# Patient Record
Sex: MALE | Race: WHITE | Employment: FULL TIME | URBAN - METROPOLITAN AREA
[De-identification: names, ages, dates, MRNs, and addresses within clinical notes are randomized per-mention and may not be internally consistent; named-entity substitution may affect disease eponyms.]

---

## 2021-02-24 ENCOUNTER — HOSPITAL ENCOUNTER (OUTPATIENT)
Dept: LAB | Age: 56
Discharge: HOME | End: 2021-02-24

## 2022-08-24 ENCOUNTER — APPOINTMENT (OUTPATIENT)
Dept: GENERAL RADIOLOGY | Age: 57
DRG: 330 | End: 2022-08-24
Attending: SURGERY
Payer: COMMERCIAL

## 2022-08-24 ENCOUNTER — HOSPITAL ENCOUNTER (INPATIENT)
Age: 57
LOS: 12 days | Discharge: HOME | DRG: 330 | End: 2022-09-05
Attending: STUDENT IN AN ORGANIZED HEALTH CARE EDUCATION/TRAINING PROGRAM | Admitting: SURGERY
Payer: COMMERCIAL

## 2022-08-24 ENCOUNTER — APPOINTMENT (OUTPATIENT)
Dept: CT IMAGING | Age: 57
DRG: 330 | End: 2022-08-24
Attending: STUDENT IN AN ORGANIZED HEALTH CARE EDUCATION/TRAINING PROGRAM
Payer: COMMERCIAL

## 2022-08-24 DIAGNOSIS — K57.20 DIVERTICULITIS OF COLON WITH PERFORATION: ICD-10-CM

## 2022-08-24 DIAGNOSIS — K57.80 PERFORATED DIVERTICULUM: Primary | ICD-10-CM

## 2022-08-24 LAB
ALBUMIN SERPL-MCNC: 3.1 G/DL (ref 3.5–5)
ALBUMIN/GLOB SERPL: 0.6 {RATIO} (ref 1.1–2.2)
ALP SERPL-CCNC: 76 U/L (ref 45–117)
ALT SERPL-CCNC: 27 U/L (ref 12–78)
ANION GAP SERPL CALC-SCNC: 6 MMOL/L (ref 5–15)
APPEARANCE UR: CLEAR
AST SERPL-CCNC: 13 U/L (ref 15–37)
ATRIAL RATE: 99 BPM
BACTERIA URNS QL MICRO: NEGATIVE /HPF
BASOPHILS # BLD: 0 K/UL (ref 0–0.1)
BASOPHILS NFR BLD: 0 % (ref 0–1)
BILIRUB SERPL-MCNC: 0.9 MG/DL (ref 0.2–1)
BILIRUB UR QL: NEGATIVE
BUN SERPL-MCNC: 18 MG/DL (ref 6–20)
BUN/CREAT SERPL: 13 (ref 12–20)
CALCIUM SERPL-MCNC: 9.4 MG/DL (ref 8.5–10.1)
CALCULATED P AXIS, ECG09: 50 DEGREES
CALCULATED R AXIS, ECG10: 74 DEGREES
CALCULATED T AXIS, ECG11: 57 DEGREES
CHLORIDE SERPL-SCNC: 95 MMOL/L (ref 97–108)
CO2 SERPL-SCNC: 28 MMOL/L (ref 21–32)
COLOR UR: ABNORMAL
CREAT SERPL-MCNC: 1.35 MG/DL (ref 0.7–1.3)
DIAGNOSIS, 93000: NORMAL
DIFFERENTIAL METHOD BLD: ABNORMAL
EOSINOPHIL # BLD: 0 K/UL (ref 0–0.4)
EOSINOPHIL NFR BLD: 0 % (ref 0–7)
EPITH CASTS URNS QL MICRO: ABNORMAL /LPF
ERYTHROCYTE [DISTWIDTH] IN BLOOD BY AUTOMATED COUNT: 13.2 % (ref 11.5–14.5)
GLOBULIN SER CALC-MCNC: 5 G/DL (ref 2–4)
GLUCOSE SERPL-MCNC: 146 MG/DL (ref 65–100)
GLUCOSE UR STRIP.AUTO-MCNC: NEGATIVE MG/DL
HCT VFR BLD AUTO: 45.5 % (ref 36.6–50.3)
HGB BLD-MCNC: 16.1 G/DL (ref 12.1–17)
HGB UR QL STRIP: NEGATIVE
HYALINE CASTS URNS QL MICRO: ABNORMAL /LPF (ref 0–2)
IMM GRANULOCYTES # BLD AUTO: 0.3 K/UL (ref 0–0.04)
IMM GRANULOCYTES NFR BLD AUTO: 1 % (ref 0–0.5)
KETONES UR QL STRIP.AUTO: NEGATIVE MG/DL
LEUKOCYTE ESTERASE UR QL STRIP.AUTO: NEGATIVE
LIPASE SERPL-CCNC: 60 U/L (ref 73–393)
LYMPHOCYTES # BLD: 0.7 K/UL (ref 0.8–3.5)
LYMPHOCYTES NFR BLD: 3 % (ref 12–49)
MCH RBC QN AUTO: 36.9 PG (ref 26–34)
MCHC RBC AUTO-ENTMCNC: 35.4 G/DL (ref 30–36.5)
MCV RBC AUTO: 104.4 FL (ref 80–99)
MONOCYTES # BLD: 1 K/UL (ref 0–1)
MONOCYTES NFR BLD: 4 % (ref 5–13)
NEUTS SEG # BLD: 22.9 K/UL (ref 1.8–8)
NEUTS SEG NFR BLD: 92 % (ref 32–75)
NITRITE UR QL STRIP.AUTO: NEGATIVE
NRBC # BLD: 0 K/UL (ref 0–0.01)
NRBC BLD-RTO: 0 PER 100 WBC
P-R INTERVAL, ECG05: 174 MS
PH UR STRIP: 6.5 [PH] (ref 5–8)
PLATELET # BLD AUTO: 314 K/UL (ref 150–400)
PMV BLD AUTO: 9 FL (ref 8.9–12.9)
POTASSIUM SERPL-SCNC: 3.7 MMOL/L (ref 3.5–5.1)
PROT SERPL-MCNC: 8.1 G/DL (ref 6.4–8.2)
PROT UR STRIP-MCNC: ABNORMAL MG/DL
Q-T INTERVAL, ECG07: 358 MS
QRS DURATION, ECG06: 96 MS
QTC CALCULATION (BEZET), ECG08: 459 MS
RBC # BLD AUTO: 4.36 M/UL (ref 4.1–5.7)
RBC #/AREA URNS HPF: ABNORMAL /HPF (ref 0–5)
RBC MORPH BLD: ABNORMAL
SODIUM SERPL-SCNC: 129 MMOL/L (ref 136–145)
SP GR UR REFRACTOMETRY: <1.005 (ref 1–1.03)
UA: UC IF INDICATED,UAUC: ABNORMAL
UROBILINOGEN UR QL STRIP.AUTO: 1 EU/DL (ref 0.2–1)
VENTRICULAR RATE, ECG03: 99 BPM
WBC # BLD AUTO: 24.9 K/UL (ref 4.1–11.1)
WBC MORPH BLD: ABNORMAL
WBC URNS QL MICRO: ABNORMAL /HPF (ref 0–4)

## 2022-08-24 PROCEDURE — 74011250637 HC RX REV CODE- 250/637: Performed by: SURGERY

## 2022-08-24 PROCEDURE — 74177 CT ABD & PELVIS W/CONTRAST: CPT

## 2022-08-24 PROCEDURE — 71045 X-RAY EXAM CHEST 1 VIEW: CPT

## 2022-08-24 PROCEDURE — 83690 ASSAY OF LIPASE: CPT

## 2022-08-24 PROCEDURE — 99285 EMERGENCY DEPT VISIT HI MDM: CPT

## 2022-08-24 PROCEDURE — 36415 COLL VENOUS BLD VENIPUNCTURE: CPT

## 2022-08-24 PROCEDURE — 81001 URINALYSIS AUTO W/SCOPE: CPT

## 2022-08-24 PROCEDURE — 65270000029 HC RM PRIVATE

## 2022-08-24 PROCEDURE — 96365 THER/PROPH/DIAG IV INF INIT: CPT

## 2022-08-24 PROCEDURE — 80053 COMPREHEN METABOLIC PANEL: CPT

## 2022-08-24 PROCEDURE — 85025 COMPLETE CBC W/AUTO DIFF WBC: CPT

## 2022-08-24 PROCEDURE — 74011000636 HC RX REV CODE- 636: Performed by: STUDENT IN AN ORGANIZED HEALTH CARE EDUCATION/TRAINING PROGRAM

## 2022-08-24 PROCEDURE — 74011000250 HC RX REV CODE- 250: Performed by: SURGERY

## 2022-08-24 PROCEDURE — 74011250636 HC RX REV CODE- 250/636: Performed by: SURGERY

## 2022-08-24 PROCEDURE — 99222 1ST HOSP IP/OBS MODERATE 55: CPT | Performed by: SURGERY

## 2022-08-24 PROCEDURE — 74018 RADEX ABDOMEN 1 VIEW: CPT

## 2022-08-24 PROCEDURE — 93005 ELECTROCARDIOGRAM TRACING: CPT

## 2022-08-24 PROCEDURE — 74011250636 HC RX REV CODE- 250/636: Performed by: STUDENT IN AN ORGANIZED HEALTH CARE EDUCATION/TRAINING PROGRAM

## 2022-08-24 PROCEDURE — 96366 THER/PROPH/DIAG IV INF ADDON: CPT

## 2022-08-24 PROCEDURE — 96375 TX/PRO/DX INJ NEW DRUG ADDON: CPT

## 2022-08-24 RX ORDER — LEVOFLOXACIN 5 MG/ML
750 INJECTION, SOLUTION INTRAVENOUS EVERY 24 HOURS
Status: DISCONTINUED | OUTPATIENT
Start: 2022-08-25 | End: 2022-09-05 | Stop reason: HOSPADM

## 2022-08-24 RX ORDER — DEXTROSE, SODIUM CHLORIDE, AND POTASSIUM CHLORIDE 5; .45; .15 G/100ML; G/100ML; G/100ML
75 INJECTION INTRAVENOUS CONTINUOUS
Status: DISCONTINUED | OUTPATIENT
Start: 2022-08-24 | End: 2022-09-05 | Stop reason: HOSPADM

## 2022-08-24 RX ORDER — HYDROMORPHONE HYDROCHLORIDE 1 MG/ML
0.5 INJECTION, SOLUTION INTRAMUSCULAR; INTRAVENOUS; SUBCUTANEOUS
Status: DISCONTINUED | OUTPATIENT
Start: 2022-08-24 | End: 2022-09-05 | Stop reason: HOSPADM

## 2022-08-24 RX ORDER — ONDANSETRON 2 MG/ML
4 INJECTION INTRAMUSCULAR; INTRAVENOUS
Status: DISCONTINUED | OUTPATIENT
Start: 2022-08-24 | End: 2022-09-05 | Stop reason: HOSPADM

## 2022-08-24 RX ORDER — MIDAZOLAM HYDROCHLORIDE 1 MG/ML
2 INJECTION, SOLUTION INTRAMUSCULAR; INTRAVENOUS ONCE
Status: DISPENSED | OUTPATIENT
Start: 2022-08-24 | End: 2022-08-25

## 2022-08-24 RX ORDER — HYDROMORPHONE HYDROCHLORIDE 1 MG/ML
1 INJECTION, SOLUTION INTRAMUSCULAR; INTRAVENOUS; SUBCUTANEOUS ONCE
Status: COMPLETED | OUTPATIENT
Start: 2022-08-24 | End: 2022-08-24

## 2022-08-24 RX ORDER — LEVOFLOXACIN 5 MG/ML
750 INJECTION, SOLUTION INTRAVENOUS
Status: COMPLETED | OUTPATIENT
Start: 2022-08-24 | End: 2022-08-24

## 2022-08-24 RX ORDER — METRONIDAZOLE 500 MG/100ML
500 INJECTION, SOLUTION INTRAVENOUS
Status: COMPLETED | OUTPATIENT
Start: 2022-08-24 | End: 2022-08-24

## 2022-08-24 RX ORDER — METRONIDAZOLE 500 MG/100ML
500 INJECTION, SOLUTION INTRAVENOUS EVERY 8 HOURS
Status: DISCONTINUED | OUTPATIENT
Start: 2022-08-24 | End: 2022-08-31

## 2022-08-24 RX ORDER — MORPHINE SULFATE 2 MG/ML
4 INJECTION, SOLUTION INTRAMUSCULAR; INTRAVENOUS ONCE
Status: COMPLETED | OUTPATIENT
Start: 2022-08-24 | End: 2022-08-24

## 2022-08-24 RX ADMIN — SODIUM CHLORIDE 1000 ML: 9 INJECTION, SOLUTION INTRAVENOUS at 10:44

## 2022-08-24 RX ADMIN — METRONIDAZOLE 500 MG: 500 INJECTION, SOLUTION INTRAVENOUS at 12:30

## 2022-08-24 RX ADMIN — HYDROMORPHONE HYDROCHLORIDE 0.5 MG: 1 INJECTION, SOLUTION INTRAMUSCULAR; INTRAVENOUS; SUBCUTANEOUS at 20:36

## 2022-08-24 RX ADMIN — BENZOCAINE 1 SPRAY: 200 SPRAY DENTAL; ORAL; PERIODONTAL at 14:00

## 2022-08-24 RX ADMIN — MORPHINE SULFATE 4 MG: 2 INJECTION, SOLUTION INTRAMUSCULAR; INTRAVENOUS at 11:11

## 2022-08-24 RX ADMIN — METRONIDAZOLE 500 MG: 500 INJECTION, SOLUTION INTRAVENOUS at 23:00

## 2022-08-24 RX ADMIN — Medication 1 SPRAY: at 17:17

## 2022-08-24 RX ADMIN — IOPAMIDOL 100 ML: 755 INJECTION, SOLUTION INTRAVENOUS at 11:23

## 2022-08-24 RX ADMIN — POTASSIUM CHLORIDE, DEXTROSE MONOHYDRATE AND SODIUM CHLORIDE 125 ML/HR: 150; 5; 450 INJECTION, SOLUTION INTRAVENOUS at 15:38

## 2022-08-24 RX ADMIN — HYDROMORPHONE HYDROCHLORIDE 1 MG: 1 INJECTION, SOLUTION INTRAMUSCULAR; INTRAVENOUS; SUBCUTANEOUS at 14:00

## 2022-08-24 RX ADMIN — LEVOFLOXACIN 750 MG: 5 INJECTION, SOLUTION INTRAVENOUS at 12:33

## 2022-08-24 NOTE — ED PROVIDER NOTES
EMERGENCY DEPARTMENT HISTORY AND PHYSICAL EXAM      Date: 8/24/2022  Patient Name: Rebecca Martinez    History of Presenting Illness     Chief Complaint   Patient presents with    Abdominal Pain     Sharp x 2 days, had telehealth visit with GI doctor, no improvement with symptoms at home, history of diverticulitis, difficulty urinating         HPI: Rebecca Martinez, 61 y.o. male presents to the ED with cc of abdominal pain. This has been going on for the past 3 days. He describes it as a diffuse lower abdominal pain that is achy and become sharp and worse with movement. Better with laying still. Saw his GI doctor and started taking antibiotics yesterday due to a history of diverticulitis and suspicion for this. After taking the antibiotics, he developed increasing bloating and belching, and 3 episodes of nonbloody emesis. He had a bowel movement yesterday. He reports some mild dysuria also that started yesterday. Reports a history of diverticulitis in the past, has not required surgery for diverticulitis before, usually resolves with oral antibiotics outpatient. He reports sweats without measured fevers. There are no other complaints, changes, or physical findings at this time. PCP: None      Medications: Aspirin, folbee, irbesartan    Past History     Past Medical History:  Hypertension, prostate cancer in remission, CRA O    Past Surgical History:  Prostatectomy, hernia surgery x3    Family History:  No family history on file. Social History:  Reports tobacco history, no alcohol or illicit drug use    Allergies:  No Known Allergies      Review of Systems   no fever  No ear pain  No eye pain  no shortness of breath  no chest pain  Reports lower abdominal pain  Reports dysuria  no leg pain  No rash  No lymphadenopathy  No weight gain    Physical Exam   Physical Exam  Constitutional:       General: He is not in acute distress. Appearance: He is not toxic-appearing.    HENT:      Head: Normocephalic and atraumatic. Eyes:      Extraocular Movements: Extraocular movements intact. Cardiovascular:      Rate and Rhythm: Normal rate and regular rhythm. Pulmonary:      Effort: Pulmonary effort is normal.      Breath sounds: Normal breath sounds. Abdominal:      Palpations: Abdomen is soft. Tenderness: There is abdominal tenderness. Comments: Abdomen is mildly distended, there is diffuse tenderness to palpation in the lower abdomen without guarding or rebound tenderness   Musculoskeletal:         General: No deformity. Cervical back: Neck supple. Skin:     General: Skin is warm and dry. Neurological:      General: No focal deficit present. Mental Status: He is alert and oriented to person, place, and time.    Psychiatric:         Mood and Affect: Mood normal.       Diagnostic Study Results     Labs -     Recent Results (from the past 24 hour(s))   EKG, 12 LEAD, INITIAL    Collection Time: 08/24/22 10:21 AM   Result Value Ref Range    Ventricular Rate 99 BPM    Atrial Rate 99 BPM    P-R Interval 174 ms    QRS Duration 96 ms    Q-T Interval 358 ms    QTC Calculation (Bezet) 459 ms    Calculated P Axis 50 degrees    Calculated R Axis 74 degrees    Calculated T Axis 57 degrees    Diagnosis       Normal sinus rhythm  Possible Left atrial enlargement  No previous ECGs available     CBC WITH AUTOMATED DIFF    Collection Time: 08/24/22 10:25 AM   Result Value Ref Range    WBC 24.9 (H) 4.1 - 11.1 K/uL    RBC 4.36 4.10 - 5.70 M/uL    HGB 16.1 12.1 - 17.0 g/dL    HCT 45.5 36.6 - 50.3 %    .4 (H) 80.0 - 99.0 FL    MCH 36.9 (H) 26.0 - 34.0 PG    MCHC 35.4 30.0 - 36.5 g/dL    RDW 13.2 11.5 - 14.5 %    PLATELET 057 853 - 852 K/uL    MPV 9.0 8.9 - 12.9 FL    NRBC 0.0 0  WBC    ABSOLUTE NRBC 0.00 0.00 - 0.01 K/uL    NEUTROPHILS 92 (H) 32 - 75 %    LYMPHOCYTES 3 (L) 12 - 49 %    MONOCYTES 4 (L) 5 - 13 %    EOSINOPHILS 0 0 - 7 %    BASOPHILS 0 0 - 1 %    IMMATURE GRANULOCYTES 1 (H) 0.0 - 0.5 %    ABS. NEUTROPHILS 22.9 (H) 1.8 - 8.0 K/UL    ABS. LYMPHOCYTES 0.7 (L) 0.8 - 3.5 K/UL    ABS. MONOCYTES 1.0 0.0 - 1.0 K/UL    ABS. EOSINOPHILS 0.0 0.0 - 0.4 K/UL    ABS. BASOPHILS 0.0 0.0 - 0.1 K/UL    ABS. IMM. GRANS. 0.3 (H) 0.00 - 0.04 K/UL    DF SMEAR SCANNED      RBC COMMENTS MACROCYTOSIS  1+        WBC COMMENTS FEW     METABOLIC PANEL, COMPREHENSIVE    Collection Time: 08/24/22 10:25 AM   Result Value Ref Range    Sodium 129 (L) 136 - 145 mmol/L    Potassium 3.7 3.5 - 5.1 mmol/L    Chloride 95 (L) 97 - 108 mmol/L    CO2 28 21 - 32 mmol/L    Anion gap 6 5 - 15 mmol/L    Glucose 146 (H) 65 - 100 mg/dL    BUN 18 6 - 20 MG/DL    Creatinine 1.35 (H) 0.70 - 1.30 MG/DL    BUN/Creatinine ratio 13 12 - 20      GFR est AA >60 >60 ml/min/1.73m2    GFR est non-AA 54 (L) >60 ml/min/1.73m2    Calcium 9.4 8.5 - 10.1 MG/DL    Bilirubin, total 0.9 0.2 - 1.0 MG/DL    ALT (SGPT) 27 12 - 78 U/L    AST (SGOT) 13 (L) 15 - 37 U/L    Alk. phosphatase 76 45 - 117 U/L    Protein, total 8.1 6.4 - 8.2 g/dL    Albumin 3.1 (L) 3.5 - 5.0 g/dL    Globulin 5.0 (H) 2.0 - 4.0 g/dL    A-G Ratio 0.6 (L) 1.1 - 2.2     LIPASE    Collection Time: 08/24/22 10:25 AM   Result Value Ref Range    Lipase 60 (L) 73 - 393 U/L       Radiologic Studies -   CT ABD PELV W CONT   Final Result      1. Diverticulitis of the sigmoid colon with contained perforation. Small amount   of extraluminal gas extends in the right lower quadrant. Stranding in the   adjacent fat but no drainable fluid collection. Given degree of wall thickening   recommend nonemergent colonoscopy to exclude underlying mass lesion   2. Distal small bowel obstruction likely due to the adjacent sigmoid   diverticulitis        CT Results  (Last 48 hours)                 08/24/22 1124  CT ABD PELV W CONT Final result    Impression:      1. Diverticulitis of the sigmoid colon with contained perforation. Small amount   of extraluminal gas extends in the right lower quadrant. Stranding in the   adjacent fat but no drainable fluid collection. Given degree of wall thickening   recommend nonemergent colonoscopy to exclude underlying mass lesion   2. Distal small bowel obstruction likely due to the adjacent sigmoid   diverticulitis       Narrative:  EXAM: CT ABD PELV W CONT       INDICATION: lower abd pain       COMPARISON: None        CONTRAST: 100 mL of Isovue-370. ORAL CONTRAST: None       TECHNIQUE:    Following the uneventful intravenous administration of contrast, thin axial   images were obtained through the abdomen and pelvis. Coronal and sagittal   reconstructions were generated. CT dose reduction was achieved through use of a   standardized protocol tailored for this examination and automatic exposure   control for dose modulation. FINDINGS:    LOWER THORAX: Atelectasis right base greater than left   LIVER: Enlarged fatty liver   BILIARY TREE: Gallbladder is within normal limits. CBD is not dilated. SPLEEN: within normal limits. PANCREAS: No mass or ductal dilatation. ADRENALS: Unremarkable. KIDNEYS: No mass, calculus, or hydronephrosis. STOMACH: Unremarkable. SMALL BOWEL: Distal small bowel obstruction   COLON: Sigmoid colon is enlarged and inflamed. Multiple diverticula. Small   amount of extraluminal gas extending from the mid sigmoid colon into the right   lower quadrant indicating small contained localized perforation. APPENDIX: Not identified   PERITONEUM: See above. Trace free fluid within the pelvis. There is no drainable   abscess   RETROPERITONEUM: There are vascular calcifications without aneurysm. No   pathologic adenopathy   REPRODUCTIVE ORGANS: Not enlarged   URINARY BLADDER: No mass or calculus. BONES: No destructive bone lesion. ABDOMINAL WALL: No mass or hernia. ADDITIONAL COMMENTS: N/A                 CXR Results  (Last 48 hours)      None              Medical Decision Making   I am the first provider for this patient.     I reviewed the vital signs, available nursing notes, past medical history, past surgical history, family history and social history. Vital Signs-Reviewed the patient's vital signs. Patient Vitals for the past 24 hrs:   Temp Pulse Resp BP SpO2   08/24/22 1133 97 °F (36.1 °C) 92 16 114/78 98 %   08/24/22 1012 97.7 °F (36.5 °C) (!) 116 18 96/70 97 %         Provider Notes (Medical Decision Making):   65-year-old male presenting with lower abdominal pain. Differential includes diverticulitis, perforation, abscess, cystitis, ileus, obstruction. He is afebrile and nontoxic-appearing. Tachycardic in triage, however on my evaluation vitals are unremarkable. He is given IV fluids and pain medications, CT will be obtained. ED Course:     Initial assessment performed. The patients presenting problems have been discussed, and they are in agreement with the care plan formulated and outlined with them. I have encouraged them to ask questions as they arise throughout their visit. ED Course as of 08/24/22 1233   Wed Aug 24, 2022   1205 Paged lorin wood [CM]      ED Course User Index  [CM] Kristal Ward MD      CBC shows leukocytosis of 24.9, negative for anemia, basic metabolic panel creatinine elevated 1.35, hyponatremia with sodium of 129. CT scan shows diverticulitis with contained perforation. Patient is resting comfortably on reevaluation, pain controlled. Spoke with Erica Reece, who agrees with admission and antibiotics. Critical Care Time:         Disposition:  Admit    PLAN:  1. There are no discharge medications for this patient.     2.   Follow-up Information    None       Return to ED if worse     Diagnosis     Clinical Impression: Acute perforated diverticulitis

## 2022-08-24 NOTE — H&P
Surgery History and Physcial    Subjective:      Gab Lu is a 61 y.o. male who presents for evaluation of abdominal pain. The pain is located in the LLQ without radiation. Pain is described as sharp and measures 7/10 in intensity. Onset of pain was 36 hours ago. Aggravating factors include movement and eating. Alleviating factors include none. Associated symptoms include anorexia, constipation, nausea, and vomiting. Pt has had 2 episodes of outpatient managed diverticulitis which were mild, in 2008 and 2014. Last colonoscopy was 2 years ago with Dr. Surjit Islas. PMH is + only for h/o diverticulitis. No PSH. Patient Active Problem List    Diagnosis Date Noted    Diverticulitis of colon with perforation 08/24/2022     No past medical history on file. No past surgical history on file. Social History     Tobacco Use    Smoking status: Not on file    Smokeless tobacco: Not on file   Substance Use Topics    Alcohol use: Not on file      No family history on file. Prior to Admission medications    Not on File     No Known Allergies      Review of Systems   Constitutional:  Positive for appetite change. Negative for chills, diaphoresis and fever. Respiratory:  Negative for shortness of breath and wheezing. Cardiovascular:  Negative for chest pain and palpitations. Gastrointestinal:  Positive for abdominal distention, abdominal pain, constipation, nausea and vomiting. Negative for diarrhea. Musculoskeletal:  Negative for myalgias. Hematological:  Does not bruise/bleed easily. All other systems reviewed and are negative. Objective:     Visit Vitals  /78   Pulse 92   Temp 97 °F (36.1 °C)   Resp 16   Ht 5' 10\" (1.778 m)   Wt 168 lb 3.4 oz (76.3 kg)   SpO2 98%   BMI 24.14 kg/m²       Physical Exam  Constitutional:       General: He is not in acute distress. Appearance: He is well-developed and normal weight. HENT:      Head: Normocephalic and atraumatic.    Eyes:      General: No scleral icterus. Pupils: Pupils are equal, round, and reactive to light. Cardiovascular:      Rate and Rhythm: Normal rate and regular rhythm. Heart sounds: Normal heart sounds. Pulmonary:      Breath sounds: Normal breath sounds. No wheezing or rales. Abdominal:      General: Abdomen is flat. Bowel sounds are normal. There is distension. Palpations: Abdomen is soft. There is no mass. Tenderness: There is abdominal tenderness in the suprapubic area and left lower quadrant. There is guarding. There is no rebound. Musculoskeletal:         General: Normal range of motion. Lymphadenopathy:      Cervical: No cervical adenopathy. Neurological:      General: No focal deficit present. Mental Status: He is alert and oriented to person, place, and time.        Imaging:  images and reports reviewed    Lab Review:    Recent Results (from the past 24 hour(s))   EKG, 12 LEAD, INITIAL    Collection Time: 08/24/22 10:21 AM   Result Value Ref Range    Ventricular Rate 99 BPM    Atrial Rate 99 BPM    P-R Interval 174 ms    QRS Duration 96 ms    Q-T Interval 358 ms    QTC Calculation (Bezet) 459 ms    Calculated P Axis 50 degrees    Calculated R Axis 74 degrees    Calculated T Axis 57 degrees    Diagnosis       Normal sinus rhythm  Possible Left atrial enlargement  No previous ECGs available  Confirmed by [place] [employer] ([id number]) on 8/24/2022 12:43:41 PM     CBC WITH AUTOMATED DIFF    Collection Time: 08/24/22 10:25 AM   Result Value Ref Range    WBC 24.9 (H) 4.1 - 11.1 K/uL    RBC 4.36 4.10 - 5.70 M/uL    HGB 16.1 12.1 - 17.0 g/dL    HCT 45.5 36.6 - 50.3 %    .4 (H) 80.0 - 99.0 FL    MCH 36.9 (H) 26.0 - 34.0 PG    MCHC 35.4 30.0 - 36.5 g/dL    RDW 13.2 11.5 - 14.5 %    PLATELET 197 721 - 699 K/uL    MPV 9.0 8.9 - 12.9 FL    NRBC 0.0 0  WBC    ABSOLUTE NRBC 0.00 0.00 - 0.01 K/uL    NEUTROPHILS 92 (H) 32 - 75 %    LYMPHOCYTES 3 (L) 12 - 49 %    MONOCYTES 4 (L) 5 - 13 %    EOSINOPHILS 0 0 - 7 %    BASOPHILS 0 0 - 1 %    IMMATURE GRANULOCYTES 1 (H) 0.0 - 0.5 %    ABS. NEUTROPHILS 22.9 (H) 1.8 - 8.0 K/UL    ABS. LYMPHOCYTES 0.7 (L) 0.8 - 3.5 K/UL    ABS. MONOCYTES 1.0 0.0 - 1.0 K/UL    ABS. EOSINOPHILS 0.0 0.0 - 0.4 K/UL    ABS. BASOPHILS 0.0 0.0 - 0.1 K/UL    ABS. IMM. GRANS. 0.3 (H) 0.00 - 0.04 K/UL    DF SMEAR SCANNED      RBC COMMENTS MACROCYTOSIS  1+        WBC COMMENTS FEW     METABOLIC PANEL, COMPREHENSIVE    Collection Time: 08/24/22 10:25 AM   Result Value Ref Range    Sodium 129 (L) 136 - 145 mmol/L    Potassium 3.7 3.5 - 5.1 mmol/L    Chloride 95 (L) 97 - 108 mmol/L    CO2 28 21 - 32 mmol/L    Anion gap 6 5 - 15 mmol/L    Glucose 146 (H) 65 - 100 mg/dL    BUN 18 6 - 20 MG/DL    Creatinine 1.35 (H) 0.70 - 1.30 MG/DL    BUN/Creatinine ratio 13 12 - 20      GFR est AA >60 >60 ml/min/1.73m2    GFR est non-AA 54 (L) >60 ml/min/1.73m2    Calcium 9.4 8.5 - 10.1 MG/DL    Bilirubin, total 0.9 0.2 - 1.0 MG/DL    ALT (SGPT) 27 12 - 78 U/L    AST (SGOT) 13 (L) 15 - 37 U/L    Alk.  phosphatase 76 45 - 117 U/L    Protein, total 8.1 6.4 - 8.2 g/dL    Albumin 3.1 (L) 3.5 - 5.0 g/dL    Globulin 5.0 (H) 2.0 - 4.0 g/dL    A-G Ratio 0.6 (L) 1.1 - 2.2     LIPASE    Collection Time: 08/24/22 10:25 AM   Result Value Ref Range    Lipase 60 (L) 73 - 393 U/L   URINALYSIS W/ REFLEX CULTURE    Collection Time: 08/24/22 12:24 PM    Specimen: Urine   Result Value Ref Range    Color YELLOW/STRAW      Appearance CLEAR CLEAR      Specific gravity <1.005 1.003 - 1.030    pH (UA) 6.5 5.0 - 8.0      Protein TRACE (A) NEG mg/dL    Glucose Negative NEG mg/dL    Ketone Negative NEG mg/dL    Bilirubin Negative NEG      Blood Negative NEG      Urobilinogen 1.0 0.2 - 1.0 EU/dL    Nitrites Negative NEG      Leukocyte Esterase Negative NEG      UA:UC IF INDICATED CULTURE NOT INDICATED BY UA RESULT CNI      WBC 0-4 0 - 4 /hpf    RBC 0-5 0 - 5 /hpf    Epithelial cells FEW FEW /lpf    Bacteria Negative NEG /hpf    Hyaline cast 0-2 0 - 2 /lpf         Assessment:     Abdominal pain, suspect recurrent sigmoid diverticulitis now with microperforation without abscess. Pt also has a high grade distal SBO secondary to the inflammatory process. Plan:     I recommend proceeding with Conservative therapy:  Observation, Intravenous antibiotics, Bowel rest, and NGT decompression. Interval CT in several days. Warrants elective colectomy once acute process is resolved. May require surgical intervention this hospitalization if SBO does not resolve .

## 2022-08-25 ENCOUNTER — APPOINTMENT (OUTPATIENT)
Dept: GENERAL RADIOLOGY | Age: 57
DRG: 330 | End: 2022-08-25
Attending: SURGERY
Payer: COMMERCIAL

## 2022-08-25 LAB
ANION GAP SERPL CALC-SCNC: 6 MMOL/L (ref 5–15)
BASOPHILS # BLD: 0 K/UL (ref 0–0.1)
BASOPHILS NFR BLD: 0 % (ref 0–1)
BUN SERPL-MCNC: 17 MG/DL (ref 6–20)
BUN/CREAT SERPL: 18 (ref 12–20)
CALCIUM SERPL-MCNC: 8.8 MG/DL (ref 8.5–10.1)
CHLORIDE SERPL-SCNC: 100 MMOL/L (ref 97–108)
CO2 SERPL-SCNC: 25 MMOL/L (ref 21–32)
CREAT SERPL-MCNC: 0.96 MG/DL (ref 0.7–1.3)
DIFFERENTIAL METHOD BLD: ABNORMAL
EOSINOPHIL # BLD: 0 K/UL (ref 0–0.4)
EOSINOPHIL NFR BLD: 0 % (ref 0–7)
ERYTHROCYTE [DISTWIDTH] IN BLOOD BY AUTOMATED COUNT: 13.3 % (ref 11.5–14.5)
GLUCOSE SERPL-MCNC: 189 MG/DL (ref 65–100)
HCT VFR BLD AUTO: 41.8 % (ref 36.6–50.3)
HGB BLD-MCNC: 14.6 G/DL (ref 12.1–17)
IMM GRANULOCYTES # BLD AUTO: 0.1 K/UL (ref 0–0.04)
IMM GRANULOCYTES NFR BLD AUTO: 1 % (ref 0–0.5)
LYMPHOCYTES # BLD: 0.9 K/UL (ref 0.8–3.5)
LYMPHOCYTES NFR BLD: 4 % (ref 12–49)
MCH RBC QN AUTO: 36.8 PG (ref 26–34)
MCHC RBC AUTO-ENTMCNC: 34.9 G/DL (ref 30–36.5)
MCV RBC AUTO: 105.3 FL (ref 80–99)
MONOCYTES # BLD: 1.3 K/UL (ref 0–1)
MONOCYTES NFR BLD: 6 % (ref 5–13)
NEUTS SEG # BLD: 18.9 K/UL (ref 1.8–8)
NEUTS SEG NFR BLD: 89 % (ref 32–75)
NRBC # BLD: 0 K/UL (ref 0–0.01)
NRBC BLD-RTO: 0 PER 100 WBC
PLATELET # BLD AUTO: 304 K/UL (ref 150–400)
PMV BLD AUTO: 9.4 FL (ref 8.9–12.9)
POTASSIUM SERPL-SCNC: 4.2 MMOL/L (ref 3.5–5.1)
RBC # BLD AUTO: 3.97 M/UL (ref 4.1–5.7)
SODIUM SERPL-SCNC: 131 MMOL/L (ref 136–145)
WBC # BLD AUTO: 21.3 K/UL (ref 4.1–11.1)

## 2022-08-25 PROCEDURE — 99232 SBSQ HOSP IP/OBS MODERATE 35: CPT | Performed by: SURGERY

## 2022-08-25 PROCEDURE — 74019 RADEX ABDOMEN 2 VIEWS: CPT

## 2022-08-25 PROCEDURE — 36415 COLL VENOUS BLD VENIPUNCTURE: CPT

## 2022-08-25 PROCEDURE — 74011250636 HC RX REV CODE- 250/636: Performed by: SURGERY

## 2022-08-25 PROCEDURE — 80048 BASIC METABOLIC PNL TOTAL CA: CPT

## 2022-08-25 PROCEDURE — 65270000029 HC RM PRIVATE

## 2022-08-25 PROCEDURE — 85025 COMPLETE CBC W/AUTO DIFF WBC: CPT

## 2022-08-25 RX ADMIN — LEVOFLOXACIN 750 MG: 5 INJECTION, SOLUTION INTRAVENOUS at 12:01

## 2022-08-25 RX ADMIN — POTASSIUM CHLORIDE, DEXTROSE MONOHYDRATE AND SODIUM CHLORIDE 125 ML/HR: 150; 5; 450 INJECTION, SOLUTION INTRAVENOUS at 04:49

## 2022-08-25 RX ADMIN — METRONIDAZOLE 500 MG: 500 INJECTION, SOLUTION INTRAVENOUS at 15:15

## 2022-08-25 RX ADMIN — METRONIDAZOLE 500 MG: 500 INJECTION, SOLUTION INTRAVENOUS at 05:22

## 2022-08-25 RX ADMIN — HYDROMORPHONE HYDROCHLORIDE 0.5 MG: 1 INJECTION, SOLUTION INTRAMUSCULAR; INTRAVENOUS; SUBCUTANEOUS at 01:45

## 2022-08-25 RX ADMIN — HYDROMORPHONE HYDROCHLORIDE 0.5 MG: 1 INJECTION, SOLUTION INTRAMUSCULAR; INTRAVENOUS; SUBCUTANEOUS at 15:32

## 2022-08-25 RX ADMIN — HYDROMORPHONE HYDROCHLORIDE 0.5 MG: 1 INJECTION, SOLUTION INTRAMUSCULAR; INTRAVENOUS; SUBCUTANEOUS at 20:05

## 2022-08-25 RX ADMIN — METRONIDAZOLE 500 MG: 500 INJECTION, SOLUTION INTRAVENOUS at 21:05

## 2022-08-25 RX ADMIN — HYDROMORPHONE HYDROCHLORIDE 0.5 MG: 1 INJECTION, SOLUTION INTRAMUSCULAR; INTRAVENOUS; SUBCUTANEOUS at 11:55

## 2022-08-25 NOTE — PROGRESS NOTES
Reason for Admission:  Diverticulitis of Colon with Perforation                     RUR Score: 7%                    Plan for utilizing home health:  Declines        PCP: First and Last name:  Alicia Colby MD     Name of Practice:    Are you a current patient: Yes/No:    Approximate date of last visit: A few months ago   Can you participate in a virtual visit with your PCP:                     Current Advanced Directive/Advance Care Plan: Full Code  CM confirmed with patient that he is a Full Code    Healthcare Decision Maker:   Click here to complete [address] including selection of the Healthcare Decision Maker Relationship (ie \"Primary\")           Ex-Wife, Devorah Marcos, [phone]                  Transition of Care Plan:                    CM spoke with patient over the phone. Patient lives at home alone. There are no steps or ramp to enter the home. Patient uses no DME and is independent in care. Patient's family will be his ride home at discharge and patient can transport himself to follow up appointments. Patient uses the [address] [place] [address] on [facility], [facility] Current Dispo: Home/self.

## 2022-08-25 NOTE — PROGRESS NOTES
TRANSFER - OUT REPORT:    Verbal report given to Jair Ramírez RN(name) on She[place]on  being transferred to surg tele/gen surg(unit) for routine progression of care       Report consisted of patients Situation, Background, Assessment and   Recommendations(SBAR). Information from the following report(s) SBAR, Kardex, Intake/Output, MAR, Accordion, Recent Results, Med Rec Status, and Cardiac Rhythm NSR  was reviewed with the receiving nurse. Lines:   Peripheral IV 08/24/22 Left Antecubital (Active)   Site Assessment Clean, dry, & intact 08/25/22 0700   Phlebitis Assessment 0 08/25/22 0700   Infiltration Assessment 0 08/25/22 0700   Dressing Status Clean, dry, & intact 08/25/22 0700   Dressing Type Tape;Transparent 08/25/22 0700   Hub Color/Line Status Pink;Flushed 08/25/22 0700        Opportunity for questions and clarification was provided.       Patient transported with:   [employer]

## 2022-08-25 NOTE — PROGRESS NOTES
Admit Date: 2022      POD * No surgery found *  * No surgery found *      Procedure:  * No surgery found *        HOSPITAL DAY:     ANTIBIOTICS: Levaquin and Flagyl    HPI:  Patient with less abdominal pain, passing some flatus, NG tube aspirate looks mostly gastric. White blood cell count improved to 21,000. Temp:  [97 °F (36.1 °C)-98 °F (36.7 °C)]   Pulse (Heart Rate):  []   BP: ()/(70-89)   Resp Rate:  [15-27]   O2 Sat (%):  [92 %-98 %]   Weight:  [76.3 kg (168 lb 3.4 oz)]       Intake and Output:  Current Shift: No intake/output data recorded. Last three shifts: 1901 -  0700  In: 3097.9 [I.V.:3097.9]  Out: 750      Blood pressure 124/81, pulse 79, temperature 98 °F (36.7 °C), resp. rate 15, height 5' 10\" (1.778 m), weight 76.3 kg (168 lb 3.4 oz), SpO2 93 %. Temp (24hrs), Av.7 °F (36.5 °C), Min:97 °F (36.1 °C), Max:98 °F (36.7 °C)        Review of Systems   Respiratory: Negative. Cardiovascular: Negative. Gastrointestinal:         Abdominal pain but improved and patient having flatus   All other systems reviewed and are negative. Physical Exam  Vitals and nursing note reviewed. Exam conducted with a chaperone present (CORNELIO Ribeiro). Constitutional:       Appearance: Normal appearance. He is not ill-appearing. HENT:      Head: Normocephalic and atraumatic. Nose: Nose normal.      Mouth/Throat:      Pharynx: Oropharynx is clear. Eyes:      Conjunctiva/sclera: Conjunctivae normal.   Cardiovascular:      Rate and Rhythm: Normal rate. Pulmonary:      Effort: Pulmonary effort is normal.   Abdominal:      General: Abdomen is flat. Palpations: Abdomen is soft. Comments: Mild to moderate left lower quadrant abdominal tenderness the rest of the abdomen is soft with only minimal tenderness no peritoneal signs or guarding or acute surgical findings. Musculoskeletal:         General: Normal range of motion. Skin:     General: Skin is warm and dry. Neurological:      General: No focal deficit present. Mental Status: He is alert and oriented to person, place, and time. Psychiatric:         Mood and Affect: Mood normal.         Behavior: Behavior normal.         Thought Content: Thought content normal.         Judgment: Judgment normal.       Recent Results (from the past 48 hour(s))   EKG, 12 LEAD, INITIAL    Collection Time: 08/24/22 10:21 AM   Result Value Ref Range    Ventricular Rate 99 BPM    Atrial Rate 99 BPM    P-R Interval 174 ms    QRS Duration 96 ms    Q-T Interval 358 ms    QTC Calculation (Bezet) 459 ms    Calculated P Axis 50 degrees    Calculated R Axis 74 degrees    Calculated T Axis 57 degrees    Diagnosis       Normal sinus rhythm  Possible Left atrial enlargement  No previous ECGs available  Confirmed by Brice Weaver ([id number]) on 8/24/2022 12:43:41 PM     CBC WITH AUTOMATED DIFF    Collection Time: 08/24/22 10:25 AM   Result Value Ref Range    WBC 24.9 (H) 4.1 - 11.1 K/uL    RBC 4.36 4.10 - 5.70 M/uL    HGB 16.1 12.1 - 17.0 g/dL    HCT 45.5 36.6 - 50.3 %    .4 (H) 80.0 - 99.0 FL    MCH 36.9 (H) 26.0 - 34.0 PG    MCHC 35.4 30.0 - 36.5 g/dL    RDW 13.2 11.5 - 14.5 %    PLATELET 009 530 - 669 K/uL    MPV 9.0 8.9 - 12.9 FL    NRBC 0.0 0  WBC    ABSOLUTE NRBC 0.00 0.00 - 0.01 K/uL    NEUTROPHILS 92 (H) 32 - 75 %    LYMPHOCYTES 3 (L) 12 - 49 %    MONOCYTES 4 (L) 5 - 13 %    EOSINOPHILS 0 0 - 7 %    BASOPHILS 0 0 - 1 %    IMMATURE GRANULOCYTES 1 (H) 0.0 - 0.5 %    ABS. NEUTROPHILS 22.9 (H) 1.8 - 8.0 K/UL    ABS. LYMPHOCYTES 0.7 (L) 0.8 - 3.5 K/UL    ABS. MONOCYTES 1.0 0.0 - 1.0 K/UL    ABS. EOSINOPHILS 0.0 0.0 - 0.4 K/UL    ABS. BASOPHILS 0.0 0.0 - 0.1 K/UL    ABS. IMM.  GRANS. 0.3 (H) 0.00 - 0.04 K/UL    DF SMEAR SCANNED      RBC COMMENTS MACROCYTOSIS  1+        WBC COMMENTS FEW     METABOLIC PANEL, COMPREHENSIVE    Collection Time: 08/24/22 10:25 AM   Result Value Ref Range    Sodium 129 (L) 136 - 145 mmol/L    Potassium 3.7 3.5 - 5.1 mmol/L    Chloride 95 (L) 97 - 108 mmol/L    CO2 28 21 - 32 mmol/L    Anion gap 6 5 - 15 mmol/L    Glucose 146 (H) 65 - 100 mg/dL    BUN 18 6 - 20 MG/DL    Creatinine 1.35 (H) 0.70 - 1.30 MG/DL    BUN/Creatinine ratio 13 12 - 20      GFR est AA >60 >60 ml/min/1.73m2    GFR est non-AA 54 (L) >60 ml/min/1.73m2    Calcium 9.4 8.5 - 10.1 MG/DL    Bilirubin, total 0.9 0.2 - 1.0 MG/DL    ALT (SGPT) 27 12 - 78 U/L    AST (SGOT) 13 (L) 15 - 37 U/L    Alk.  phosphatase 76 45 - 117 U/L    Protein, total 8.1 6.4 - 8.2 g/dL    Albumin 3.1 (L) 3.5 - 5.0 g/dL    Globulin 5.0 (H) 2.0 - 4.0 g/dL    A-G Ratio 0.6 (L) 1.1 - 2.2     LIPASE    Collection Time: 08/24/22 10:25 AM   Result Value Ref Range    Lipase 60 (L) 73 - 393 U/L   URINALYSIS W/ REFLEX CULTURE    Collection Time: 08/24/22 12:24 PM    Specimen: Urine   Result Value Ref Range    Color YELLOW/STRAW      Appearance CLEAR CLEAR      Specific gravity <1.005 1.003 - 1.030    pH (UA) 6.5 5.0 - 8.0      Protein TRACE (A) NEG mg/dL    Glucose Negative NEG mg/dL    Ketone Negative NEG mg/dL    Bilirubin Negative NEG      Blood Negative NEG      Urobilinogen 1.0 0.2 - 1.0 EU/dL    Nitrites Negative NEG      Leukocyte Esterase Negative NEG      UA:UC IF INDICATED CULTURE NOT INDICATED BY UA RESULT CNI      WBC 0-4 0 - 4 /hpf    RBC 0-5 0 - 5 /hpf    Epithelial cells FEW FEW /lpf    Bacteria Negative NEG /hpf    Hyaline cast 0-2 0 - 2 /lpf   METABOLIC PANEL, BASIC    Collection Time: 08/25/22  3:08 AM   Result Value Ref Range    Sodium 131 (L) 136 - 145 mmol/L    Potassium 4.2 3.5 - 5.1 mmol/L    Chloride 100 97 - 108 mmol/L    CO2 25 21 - 32 mmol/L    Anion gap 6 5 - 15 mmol/L    Glucose 189 (H) 65 - 100 mg/dL    BUN 17 6 - 20 MG/DL    Creatinine 0.96 0.70 - 1.30 MG/DL    BUN/Creatinine ratio 18 12 - 20      GFR est AA >60 >60 ml/min/1.73m2    GFR est non-AA >60 >60 ml/min/1.73m2    Calcium 8.8 8.5 - 10.1 MG/DL   CBC WITH AUTOMATED DIFF    Collection Time: 08/25/22 3:08 AM   Result Value Ref Range    WBC 21.3 (H) 4.1 - 11.1 K/uL    RBC 3.97 (L) 4.10 - 5.70 M/uL    HGB 14.6 12.1 - 17.0 g/dL    HCT 41.8 36.6 - 50.3 %    .3 (H) 80.0 - 99.0 FL    MCH 36.8 (H) 26.0 - 34.0 PG    MCHC 34.9 30.0 - 36.5 g/dL    RDW 13.3 11.5 - 14.5 %    PLATELET 110 675 - 536 K/uL    MPV 9.4 8.9 - 12.9 FL    NRBC 0.0 0  WBC    ABSOLUTE NRBC 0.00 0.00 - 0.01 K/uL    NEUTROPHILS 89 (H) 32 - 75 %    LYMPHOCYTES 4 (L) 12 - 49 %    MONOCYTES 6 5 - 13 %    EOSINOPHILS 0 0 - 7 %    BASOPHILS 0 0 - 1 %    IMMATURE GRANULOCYTES 1 (H) 0.0 - 0.5 %    ABS. NEUTROPHILS 18.9 (H) 1.8 - 8.0 K/UL    ABS. LYMPHOCYTES 0.9 0.8 - 3.5 K/UL    ABS. MONOCYTES 1.3 (H) 0.0 - 1.0 K/UL    ABS. EOSINOPHILS 0.0 0.0 - 0.4 K/UL    ABS. BASOPHILS 0.0 0.0 - 0.1 K/UL    ABS. IMM. GRANS. 0.1 (H) 0.00 - 0.04 K/UL    DF AUTOMATED           XR Results (maximum last 3): Results from [place] encounter on 08/24/22    XR ABD FLAT/ ERECT    Impression  Diffuse small bowel distention, compatible with distal small bowel  obstruction. Nasogastric tube appears to be in satisfactory position. CT Results (maximum last 3): Results from [place] encounter on 08/24/22    CT ABD PELV W CONT    Impression  1. Diverticulitis of the sigmoid colon with contained perforation. Small amount  of extraluminal gas extends in the right lower quadrant. Stranding in the  adjacent fat but no drainable fluid collection. Given degree of wall thickening  recommend nonemergent colonoscopy to exclude underlying mass lesion  2. Distal small bowel obstruction likely due to the adjacent sigmoid  diverticulitis      MRI Results (maximum last 3): No results found for this or any previous visit. Nuclear Medicine Results (maximum last 3): No results found for this or any previous visit. US Results (maximum last 3): No results found for this or any previous visit.             Principal Problem:    Diverticulitis of colon with perforation (8/24/2022)          ASSESSMENT/PLAN  Continue IV antibiotics    Continue NG tube for now, see how he is doing tomorrow clinically and with output,    Follow-up CT scan in 2 days with oral contrast to assess improvement    If patient worsens may need more urgent laparotomy colectomy possible colostomy patient is aware benefits was alternatives and potential.  Patient did have a colonoscopy 2 years ago apparently   Would recommend patient consider elective colectomy if this is not necessary urgently while hospitalized, as this is the patient's third episode of diverticulitis. Patient is aware of the recommendation will follow-up in the office with office-based surgeons to consider or with colorectal surgery if patient prefers.         FACE TO FACE time including review of any indicated imaging, discussion with patient, and other providers, exam and discussion with patient:   2 3          minutes    END:

## 2022-08-25 NOTE — PROGRESS NOTES
Transition of Care Plan:    RUR: 7% low risk for readmission   Disposition: Home   Follow up appointments: PCP, Surgeon? DME needed: None anticipated  Transportation at Discharge: Driving self home   [address] or means to access home: Has access  IM Medicare Letter: N/A - Commercial coverage  Is patient a [place] and connected with the [place]? N/A              If yes, was [place] transfer form completed and [facility] notified? Caregiver Contact: Pt's son, Alejandra Terrazas) [phone]  Discharge Caregiver contacted prior to discharge? To be contacted if pt wishes  Care Conference needed?:   No                Reason for Admission:    Diverticulitis of colon with perforation                        RUR Score:  7% low risk for readmission                    Plan for utilizing home health: No home health needs identified         PCP: First and Last name:  Sees Dr. Antonette Edgar     Name of Practice:    Are you a current patient: Yes/No: Yes   Approximate date of last visit: Couple months ago, sees 2x/year   Can you participate in a virtual visit with your PCP: No                    Current Advanced Directive/Advance Care Plan: Full Code  Advance Care Planning   Healthcare Decision Maker:  Pt is not , pt's son is legal next of kin    Today we documented Decision Maker(s) consistent with Legal Next of Kin hierarchy. Healthcare Decision Maker:   Rocio Brown - SonKristopher Shows                   Transition of Care Plan:   Home with outpatient follow up    Initial note: CM reviewed chart. CM completed assessment with pt at bedside. CM introduced self, role of CM, verified demographics, and discussed transition of care planning. Pt is independent at baseline, no DME use, will transport self home at d/c. Pt identifies support of son and ex-spouse. Pt voiced no concerns with transition of care plan at this time. No barriers identified by CM.  Care management will continue to be available to assist as transition of care planning needs arise. Care Management Interventions  PCP Verified by CM:  Yes  Mode of Transport at Discharge: Self  Transition of Care Consult (CM Consult): Discharge Planning  Discharge Durable Medical Equipment: No  Physical Therapy Consult: No  Occupational Therapy Consult: No  Speech Therapy Consult: No  Support Systems: Child(renee) (Son, ex-spouse)  Confirm Follow Up Transport: Self  Discharge Location  Patient Expects to be Discharged to[de-identified] [address] [place] [address] [address], [address]

## 2022-08-25 NOTE — PROGRESS NOTES
End of Shift Note    Bedside shift change report given to Nela Neri (oncoming nurse) by Serina Graham RN (offgoing nurse). Report included the following information SBAR, Kardex, and MAR    Shift worked:  [id number]     Shift summary and any significant changes:     no     Concerns for physician to address:  no     Zone phone for oncoming shift:          Activity:  Activity Level: Up with Assistance  Number times ambulated in hallways past shift: 0  Number of times OOB to chair past shift: 2    Cardiac:   Cardiac Monitoring: Yes      Cardiac Rhythm: Sinus Rhythm    Access:  Current line(s): PIV     Genitourinary:   Urinary status: voiding    Respiratory:   O2 Device: None (Room air)  Chronic home O2 use?: NO  Incentive spirometer at bedside: NO       GI:  Last Bowel Movement Date: 08/23/22  Current diet:  DIET NPO  Passing flatus: YES  Tolerating current diet: YES       Pain Management:   Patient states pain is manageable on current regimen: YES    Skin:  Urban Score: 23  Interventions: speciality bed and increase time out of bed    Patient Safety:  Fall Score:  Total Score: 1  Interventions: assistive device (walker, cane, etc), gripper socks, and pt to call before getting OOB       Length of Stay:  Expected LOS: - - -  Actual LOS: 1      Serina Graham RN

## 2022-08-26 LAB
ANION GAP SERPL CALC-SCNC: 4 MMOL/L (ref 5–15)
BUN SERPL-MCNC: 13 MG/DL (ref 6–20)
BUN/CREAT SERPL: 13 (ref 12–20)
CALCIUM SERPL-MCNC: 8.8 MG/DL (ref 8.5–10.1)
CHLORIDE SERPL-SCNC: 102 MMOL/L (ref 97–108)
CO2 SERPL-SCNC: 27 MMOL/L (ref 21–32)
CREAT SERPL-MCNC: 0.98 MG/DL (ref 0.7–1.3)
ERYTHROCYTE [DISTWIDTH] IN BLOOD BY AUTOMATED COUNT: 13.2 % (ref 11.5–14.5)
GLUCOSE SERPL-MCNC: 144 MG/DL (ref 65–100)
HCT VFR BLD AUTO: 44.2 % (ref 36.6–50.3)
HGB BLD-MCNC: 15.3 G/DL (ref 12.1–17)
MCH RBC QN AUTO: 36.8 PG (ref 26–34)
MCHC RBC AUTO-ENTMCNC: 34.6 G/DL (ref 30–36.5)
MCV RBC AUTO: 106.3 FL (ref 80–99)
NRBC # BLD: 0 K/UL (ref 0–0.01)
NRBC BLD-RTO: 0 PER 100 WBC
PLATELET # BLD AUTO: 345 K/UL (ref 150–400)
PMV BLD AUTO: 8.9 FL (ref 8.9–12.9)
POTASSIUM SERPL-SCNC: 4 MMOL/L (ref 3.5–5.1)
RBC # BLD AUTO: 4.16 M/UL (ref 4.1–5.7)
SODIUM SERPL-SCNC: 133 MMOL/L (ref 136–145)
WBC # BLD AUTO: 16.8 K/UL (ref 4.1–11.1)

## 2022-08-26 PROCEDURE — 80048 BASIC METABOLIC PNL TOTAL CA: CPT

## 2022-08-26 PROCEDURE — 65270000029 HC RM PRIVATE

## 2022-08-26 PROCEDURE — 99232 SBSQ HOSP IP/OBS MODERATE 35: CPT | Performed by: SURGERY

## 2022-08-26 PROCEDURE — 85027 COMPLETE CBC AUTOMATED: CPT

## 2022-08-26 PROCEDURE — 36415 COLL VENOUS BLD VENIPUNCTURE: CPT

## 2022-08-26 PROCEDURE — 74011250636 HC RX REV CODE- 250/636: Performed by: SURGERY

## 2022-08-26 RX ADMIN — HYDROMORPHONE HYDROCHLORIDE 0.5 MG: 1 INJECTION, SOLUTION INTRAMUSCULAR; INTRAVENOUS; SUBCUTANEOUS at 17:40

## 2022-08-26 RX ADMIN — POTASSIUM CHLORIDE, DEXTROSE MONOHYDRATE AND SODIUM CHLORIDE 125 ML/HR: 150; 5; 450 INJECTION, SOLUTION INTRAVENOUS at 11:55

## 2022-08-26 RX ADMIN — POTASSIUM CHLORIDE, DEXTROSE MONOHYDRATE AND SODIUM CHLORIDE 125 ML/HR: 150; 5; 450 INJECTION, SOLUTION INTRAVENOUS at 03:04

## 2022-08-26 RX ADMIN — HYDROMORPHONE HYDROCHLORIDE 0.5 MG: 1 INJECTION, SOLUTION INTRAMUSCULAR; INTRAVENOUS; SUBCUTANEOUS at 03:38

## 2022-08-26 RX ADMIN — HYDROMORPHONE HYDROCHLORIDE 0.5 MG: 1 INJECTION, SOLUTION INTRAMUSCULAR; INTRAVENOUS; SUBCUTANEOUS at 11:47

## 2022-08-26 RX ADMIN — POTASSIUM CHLORIDE, DEXTROSE MONOHYDRATE AND SODIUM CHLORIDE 125 ML/HR: 150; 5; 450 INJECTION, SOLUTION INTRAVENOUS at 22:06

## 2022-08-26 RX ADMIN — METRONIDAZOLE 500 MG: 500 INJECTION, SOLUTION INTRAVENOUS at 06:14

## 2022-08-26 RX ADMIN — METRONIDAZOLE 500 MG: 500 INJECTION, SOLUTION INTRAVENOUS at 22:15

## 2022-08-26 RX ADMIN — METRONIDAZOLE 500 MG: 500 INJECTION, SOLUTION INTRAVENOUS at 15:13

## 2022-08-26 RX ADMIN — HYDROMORPHONE HYDROCHLORIDE 0.5 MG: 1 INJECTION, SOLUTION INTRAMUSCULAR; INTRAVENOUS; SUBCUTANEOUS at 22:06

## 2022-08-26 RX ADMIN — HYDROMORPHONE HYDROCHLORIDE 0.5 MG: 1 INJECTION, SOLUTION INTRAMUSCULAR; INTRAVENOUS; SUBCUTANEOUS at 00:25

## 2022-08-26 RX ADMIN — LEVOFLOXACIN 750 MG: 5 INJECTION, SOLUTION INTRAVENOUS at 13:34

## 2022-08-26 NOTE — PROGRESS NOTES
Physician Progress Note      PATIENT:               Danna Morfin  CSN #:                  [id number]  :                       1965  ADMIT DATE:       2022 10:16 AM  DISCH DATE:  RESPONDING  PROVIDER #:        AKILAH LIND MD          QUERY TEXT:    Patient admitted with Abdominal Pain, noted to have Cr 1.35. If possible, please document in progress notes and discharge summary if you are evaluating and/or treating any of the following: The medical record reflects the following:  Risk Factors: C/o Abdominal pain  Clinical Indicators: Bun/Cr: 18/1.35 ^ 17/0.96; GFR: 47 ^ >60  Treatment: Labs; IVF    Thank you,    Aníbal Oconnor  CDI  Options provided:  -- Acute kidney insufficiency  -- Acute kidney injury  -- Elevate creatinine  -- Other - I will add my own diagnosis  -- Disagree - Not applicable / Not valid  -- Disagree - Clinically unable to determine / Unknown  -- Refer to Clinical Documentation Reviewer    PROVIDER RESPONSE TEXT:    This patient has an elevated creatinine. Query created by: Duarte Wilkins on 2022 10:52 AM      QUERY TEXT:    Pt admitted with Abdominal pain. Pt noted to have HR 92, RR 22, WBC 24.9 and Diverticulitis of the sigmoid colon with contained perforation per CT Abd/Pelvis. If possible, please document in the progress notes and discharge summary if you are evaluating and /or treating any of the following: The medical record reflects the following:  Risk Factors: C/o Abdominal Pain  Clinical Indicators: hr: ; rr: 22-27. Kalee Graven ...wbc: 24.9; bands: 22.9; Na+: 129; K+: 3.7; gluc: 146; Bun/Cr: 18/1.35 ^ 0.96; alb: 3.1. Kalee Graven Kalee Graven Kalee Graven ct abd/pelvis: Diverticulitis of the sigmoid colon with contained perforation. ....... Kalee Graven Distal small bowel obstruction likely due to the adjacent sigmoid diverticulitis    Treatment: Labs; CT Abd/Pelvis; IVF NS Bolus; IV Flagyl; IV Zosyn; IV Levaquin;  NGT decompression, Bowel Rest      Thank you,    Aníbal Oconnor  CDI  Options provided:  -- Sepsis, present on admission  -- Sepsis, present on admission now resolved  -- Diverticulitis of the sigmoid colon with contained perforation without Sepsis  -- Sepsis was ruled out  -- Other - I will add my own diagnosis  -- Disagree - Not applicable / Not valid  -- Disagree - Clinically unable to determine / Unknown  -- Refer to Clinical Documentation Reviewer    PROVIDER RESPONSE TEXT:    This patient has Diverticulitis of the sigmoid colon with contained perforation without Sepsis.     Query created by: Fang Siegel on 8/25/2022 10:58 AM      Electronically signed by:  Alex Osborn MD 8/26/2022 9:22 AM

## 2022-08-26 NOTE — PROGRESS NOTES
End of Shift Note    Bedside shift change report given to [address] [place] [address] [address] (oncoming nurse) by Oniel Cochran RN (offgoing nurse). Report included the following information SBAR, Kardex, and MAR    Shift worked: 7pm-7am     Shift summary and any significant changes:    Patient had periods of pain throughout the night relieved by PRN doses of dilaudid. Abdomen remains distended and tender. NG tube set to mod. Continuous suction putting out dark brown gastric output. Vital signs remained stable. Concerns for physician to address:      Zone phone for oncoming shift:          Activity:  Activity Level: Up with Assistance  Number times ambulated in hallways past shift: 0      Cardiac:   Cardiac Monitoring: Yes      Cardiac Rhythm: Sinus Rhythm    Access:  Current line(s): PIV     Genitourinary:   Urinary status: voiding    Respiratory:   O2 Device: None (Room air)  Chronic home O2 use?: NO  Incentive spirometer at bedside: NO       GI:  Last Bowel Movement Date: 08/23/22  Current diet:  DIET NPO Ice Chips  Passing flatus: YES  Tolerating current diet: YES       Pain Management:   Patient states pain is manageable on current regimen: YES    Skin:  Urban Score: 20  Interventions: speciality bed and increase time out of bed    Patient Safety:  Fall Score:  Total Score: 2  Interventions: assistive device (walker, cane, etc), gripper socks, and pt to call before getting OOB       Length of Stay:  Expected LOS: 2d 14h  Actual LOS: 2      Oniel Cochran RN

## 2022-08-26 NOTE — PROGRESS NOTES
Admit Date: 2022      POD * No surgery found *  * No surgery found *      Procedure:  * No surgery found *        HOSPITAL DAY:     ANTIBIOTICS: Levaquin and Flagyl    HPI:  Patient feeling better, less abdominal pain, passing some flatus, white blood cell count trending down to 16,000. Minimal NG tube output last 12 hours. Appears gastric not bilious. Temp:  [97 °F (36.1 °C)-98.6 °F (37 °C)]   Pulse (Heart Rate):  []   BP: ()/(70-89)   Resp Rate:  [15-27]   O2 Sat (%):  [92 %-98 %]   Weight:  [76.3 kg (168 lb 3.4 oz)]       Intake and Output:  Current Shift:  07 - 1900  In: -   Out: 300 [Urine:300]  Last three shifts: 1901 -  0700  In: 1847.9 [I.V.:1847.9]  Out: 1500 [Urine:350]     Blood pressure 132/88, pulse 86, temperature 98.6 °F (37 °C), resp. rate 18, height 5' 10\" (1.778 m), weight 76.3 kg (168 lb 3.4 oz), SpO2 93 %. Temp (24hrs), Av.1 °F (36.7 °C), Min:97.4 °F (36.3 °C), Max:98.6 °F (37 °C)        Review of Systems   Respiratory: Negative. Cardiovascular: Negative. Gastrointestinal:         Less abdominal pain and passing flatus. All other systems reviewed and are negative. Physical Exam  Vitals and nursing note reviewed. Exam conducted with a chaperone present (CORNELIO Fritz). Constitutional:       General: He is not in acute distress. Appearance: Normal appearance. He is not ill-appearing. HENT:      Head: Normocephalic and atraumatic. Mouth/Throat:      Pharynx: Oropharynx is clear. Eyes:      Conjunctiva/sclera: Conjunctivae normal.   Cardiovascular:      Rate and Rhythm: Normal rate and regular rhythm. Pulmonary:      Effort: Pulmonary effort is normal.      Breath sounds: Normal breath sounds. Abdominal:      General: Abdomen is flat.       Comments: Softer, less tender, tenderness localizing to the left lower abdomen and suprapubic area but the rest of the abdomen is soft with minimal tenderness and much improved no peritoneal signs   Musculoskeletal:         General: Normal range of motion. Skin:     General: Skin is warm and dry. Neurological:      General: No focal deficit present. Mental Status: He is alert and oriented to person, place, and time. Psychiatric:         Mood and Affect: Mood normal.         Behavior: Behavior normal.         Thought Content: Thought content normal.         Judgment: Judgment normal.       Recent Results (from the past 48 hour(s))   EKG, 12 LEAD, INITIAL    Collection Time: 08/24/22 10:21 AM   Result Value Ref Range    Ventricular Rate 99 BPM    Atrial Rate 99 BPM    P-R Interval 174 ms    QRS Duration 96 ms    Q-T Interval 358 ms    QTC Calculation (Bezet) 459 ms    Calculated P Axis 50 degrees    Calculated R Axis 74 degrees    Calculated T Axis 57 degrees    Diagnosis       Normal sinus rhythm  Possible Left atrial enlargement  No previous ECGs available  Confirmed by Laney Santacruz ([id number]) on 8/24/2022 12:43:41 PM     CBC WITH AUTOMATED DIFF    Collection Time: 08/24/22 10:25 AM   Result Value Ref Range    WBC 24.9 (H) 4.1 - 11.1 K/uL    RBC 4.36 4.10 - 5.70 M/uL    HGB 16.1 12.1 - 17.0 g/dL    HCT 45.5 36.6 - 50.3 %    .4 (H) 80.0 - 99.0 FL    MCH 36.9 (H) 26.0 - 34.0 PG    MCHC 35.4 30.0 - 36.5 g/dL    RDW 13.2 11.5 - 14.5 %    PLATELET 680 277 - 937 K/uL    MPV 9.0 8.9 - 12.9 FL    NRBC 0.0 0  WBC    ABSOLUTE NRBC 0.00 0.00 - 0.01 K/uL    NEUTROPHILS 92 (H) 32 - 75 %    LYMPHOCYTES 3 (L) 12 - 49 %    MONOCYTES 4 (L) 5 - 13 %    EOSINOPHILS 0 0 - 7 %    BASOPHILS 0 0 - 1 %    IMMATURE GRANULOCYTES 1 (H) 0.0 - 0.5 %    ABS. NEUTROPHILS 22.9 (H) 1.8 - 8.0 K/UL    ABS. LYMPHOCYTES 0.7 (L) 0.8 - 3.5 K/UL    ABS. MONOCYTES 1.0 0.0 - 1.0 K/UL    ABS. EOSINOPHILS 0.0 0.0 - 0.4 K/UL    ABS. BASOPHILS 0.0 0.0 - 0.1 K/UL    ABS. IMM.  GRANS. 0.3 (H) 0.00 - 0.04 K/UL    DF SMEAR SCANNED      RBC COMMENTS MACROCYTOSIS  1+        WBC COMMENTS FEW     METABOLIC PANEL, COMPREHENSIVE    Collection Time: 08/24/22 10:25 AM   Result Value Ref Range    Sodium 129 (L) 136 - 145 mmol/L    Potassium 3.7 3.5 - 5.1 mmol/L    Chloride 95 (L) 97 - 108 mmol/L    CO2 28 21 - 32 mmol/L    Anion gap 6 5 - 15 mmol/L    Glucose 146 (H) 65 - 100 mg/dL    BUN 18 6 - 20 MG/DL    Creatinine 1.35 (H) 0.70 - 1.30 MG/DL    BUN/Creatinine ratio 13 12 - 20      GFR est AA >60 >60 ml/min/1.73m2    GFR est non-AA 54 (L) >60 ml/min/1.73m2    Calcium 9.4 8.5 - 10.1 MG/DL    Bilirubin, total 0.9 0.2 - 1.0 MG/DL    ALT (SGPT) 27 12 - 78 U/L    AST (SGOT) 13 (L) 15 - 37 U/L    Alk.  phosphatase 76 45 - 117 U/L    Protein, total 8.1 6.4 - 8.2 g/dL    Albumin 3.1 (L) 3.5 - 5.0 g/dL    Globulin 5.0 (H) 2.0 - 4.0 g/dL    A-G Ratio 0.6 (L) 1.1 - 2.2     LIPASE    Collection Time: 08/24/22 10:25 AM   Result Value Ref Range    Lipase 60 (L) 73 - 393 U/L   URINALYSIS W/ REFLEX CULTURE    Collection Time: 08/24/22 12:24 PM    Specimen: Urine   Result Value Ref Range    Color YELLOW/STRAW      Appearance CLEAR CLEAR      Specific gravity <1.005 1.003 - 1.030    pH (UA) 6.5 5.0 - 8.0      Protein TRACE (A) NEG mg/dL    Glucose Negative NEG mg/dL    Ketone Negative NEG mg/dL    Bilirubin Negative NEG      Blood Negative NEG      Urobilinogen 1.0 0.2 - 1.0 EU/dL    Nitrites Negative NEG      Leukocyte Esterase Negative NEG      UA:UC IF INDICATED CULTURE NOT INDICATED BY UA RESULT CNI      WBC 0-4 0 - 4 /hpf    RBC 0-5 0 - 5 /hpf    Epithelial cells FEW FEW /lpf    Bacteria Negative NEG /hpf    Hyaline cast 0-2 0 - 2 /lpf   METABOLIC PANEL, BASIC    Collection Time: 08/25/22  3:08 AM   Result Value Ref Range    Sodium 131 (L) 136 - 145 mmol/L    Potassium 4.2 3.5 - 5.1 mmol/L    Chloride 100 97 - 108 mmol/L    CO2 25 21 - 32 mmol/L    Anion gap 6 5 - 15 mmol/L    Glucose 189 (H) 65 - 100 mg/dL    BUN 17 6 - 20 MG/DL    Creatinine 0.96 0.70 - 1.30 MG/DL    BUN/Creatinine ratio 18 12 - 20      GFR est AA >60 >60 ml/min/1.73m2 GFR est non-AA >60 >60 ml/min/1.73m2    Calcium 8.8 8.5 - 10.1 MG/DL   CBC WITH AUTOMATED DIFF    Collection Time: 08/25/22  3:08 AM   Result Value Ref Range    WBC 21.3 (H) 4.1 - 11.1 K/uL    RBC 3.97 (L) 4.10 - 5.70 M/uL    HGB 14.6 12.1 - 17.0 g/dL    HCT 41.8 36.6 - 50.3 %    .3 (H) 80.0 - 99.0 FL    MCH 36.8 (H) 26.0 - 34.0 PG    MCHC 34.9 30.0 - 36.5 g/dL    RDW 13.3 11.5 - 14.5 %    PLATELET 489 466 - 269 K/uL    MPV 9.4 8.9 - 12.9 FL    NRBC 0.0 0  WBC    ABSOLUTE NRBC 0.00 0.00 - 0.01 K/uL    NEUTROPHILS 89 (H) 32 - 75 %    LYMPHOCYTES 4 (L) 12 - 49 %    MONOCYTES 6 5 - 13 %    EOSINOPHILS 0 0 - 7 %    BASOPHILS 0 0 - 1 %    IMMATURE GRANULOCYTES 1 (H) 0.0 - 0.5 %    ABS. NEUTROPHILS 18.9 (H) 1.8 - 8.0 K/UL    ABS. LYMPHOCYTES 0.9 0.8 - 3.5 K/UL    ABS. MONOCYTES 1.3 (H) 0.0 - 1.0 K/UL    ABS. EOSINOPHILS 0.0 0.0 - 0.4 K/UL    ABS. BASOPHILS 0.0 0.0 - 0.1 K/UL    ABS. IMM. GRANS. 0.1 (H) 0.00 - 0.04 K/UL    DF AUTOMATED     CBC W/O DIFF    Collection Time: 08/26/22  3:00 AM   Result Value Ref Range    WBC 16.8 (H) 4.1 - 11.1 K/uL    RBC 4.16 4.10 - 5.70 M/uL    HGB 15.3 12.1 - 17.0 g/dL    HCT 44.2 36.6 - 50.3 %    .3 (H) 80.0 - 99.0 FL    MCH 36.8 (H) 26.0 - 34.0 PG    MCHC 34.6 30.0 - 36.5 g/dL    RDW 13.2 11.5 - 14.5 %    PLATELET 067 620 - 600 K/uL    MPV 8.9 8.9 - 12.9 FL    NRBC 0.0 0  WBC    ABSOLUTE NRBC 0.00 0.00 - 3.08 K/uL   METABOLIC PANEL, BASIC    Collection Time: 08/26/22  3:00 AM   Result Value Ref Range    Sodium 133 (L) 136 - 145 mmol/L    Potassium 4.0 3.5 - 5.1 mmol/L    Chloride 102 97 - 108 mmol/L    CO2 27 21 - 32 mmol/L    Anion gap 4 (L) 5 - 15 mmol/L    Glucose 144 (H) 65 - 100 mg/dL    BUN 13 6 - 20 MG/DL    Creatinine 0.98 0.70 - 1.30 MG/DL    BUN/Creatinine ratio 13 12 - 20      GFR est AA >60 >60 ml/min/1.73m2    GFR est non-AA >60 >60 ml/min/1.73m2    Calcium 8.8 8.5 - 10.1 MG/DL         XR Results (maximum last 3):   Results from [facility] encounter on 08/24/22    XR ABD FLAT/ ERECT    Impression  Diffuse small bowel distention, compatible with distal small bowel  obstruction. Nasogastric tube appears to be in satisfactory position. CT Results (maximum last 3): Results from [place] encounter on 08/24/22    CT ABD PELV W CONT    Impression  1. Diverticulitis of the sigmoid colon with contained perforation. Small amount  of extraluminal gas extends in the right lower quadrant. Stranding in the  adjacent fat but no drainable fluid collection. Given degree of wall thickening  recommend nonemergent colonoscopy to exclude underlying mass lesion  2. Distal small bowel obstruction likely due to the adjacent sigmoid  diverticulitis      MRI Results (maximum last 3): No results found for this or any previous visit. Nuclear Medicine Results (maximum last 3): No results found for this or any previous visit. US Results (maximum last 3): No results found for this or any previous visit. Principal Problem:    Diverticulitis of colon with perforation (8/24/2022)          ASSESSMENT/PLAN  Follow-up CT scan abdomen and pelvis with oral contrast Saturday for interval data point to assess whether patient can be switched to oral antibiotics and work towards oral diet and discharge or needs interval percutaneous drain or whether there is any indication for more urgent surgery. All above reviewed with patient and plans and he understood. Clamp NG tube and check residuals see if this can be removed today.       FACE TO FACE time including review of any indicated imaging, discussion with patient, and other providers, exam and discussion with patient:   23 minutes    END:

## 2022-08-26 NOTE — PROGRESS NOTES
Problem: Falls - Risk of  Goal: *Absence of Falls  Description: Document [place] Fall Risk and appropriate interventions in the flowsheet.   Outcome: Progressing Towards Goal  Note: Fall Risk Interventions:            Medication Interventions: Bed/chair exit alarm, Patient to call before getting OOB         History of Falls Interventions: Bed/chair exit alarm         Problem: Patient Education: Go to Patient Education Activity  Goal: Patient/Family Education  Outcome: Progressing Towards Goal

## 2022-08-26 NOTE — PROGRESS NOTES
Bedside and Verbal shift change report given to Zaheer Moe RN (oncoming nurse) by Zaheer Moe RN (offgoing nurse). Report included the following information SBAR, Kardex, and Recent Results. 6088 Patient NG disconnected, will recheck in 4 hours. If residual from NG is less than 150ml's there is an order to remove NG per provider. 1200 Patient became very uncomfortable while NGT was clamped. RN placed patient back on moderate continuous suction, 200mL's of fluid filled the canister.   RN will notify surgery team.

## 2022-08-26 NOTE — PROGRESS NOTES
End of Shift Note    Bedside shift change report given to [address] (oncoming nurse) by Inna Calderon LPN (offgoing nurse). Report included the following information SBAR, Kardex, ED Summary, Procedure Summary, MAR, and Recent Results    Shift worked:  7am-7pm     Shift summary and any significant changes:     Plan of care. NG to continuous suction, patient NPO, pain needs adressed. Concerns for physician to address:  Plan of care     Zone phone for oncoming shift:   [phone]       Activity:  Activity Level: Up with Assistance  Number times ambulated in hallways past shift: 0  Number of times OOB to chair past shift: 0    Cardiac:   Cardiac Monitoring: No      Cardiac Rhythm: Sinus Rhythm    Access:  Current line(s): PIV     Genitourinary:   Urinary status: voiding    Respiratory:   O2 Device: None (Room air)  Chronic home O2 use?: NO  Incentive spirometer at bedside: N/A       GI:  Last Bowel Movement Date: 08/23/22  Current diet:  DIET NPO Ice Chips  Passing flatus: NO  Tolerating current diet: NPO       Pain Management:   Patient states pain is manageable on current regimen: YES    Skin:  Urban Score: 21  Interventions: increase time out of bed    Patient Safety:  Fall Score:  Total Score: 1  Interventions: gripper socks, pt to call before getting OOB, and stay with me (per policy)       Length of Stay:  Expected LOS: 2d 14h  Actual LOS: [address], LPN

## 2022-08-26 NOTE — PROGRESS NOTES
Problem: Falls - Risk of  Goal: *Absence of Falls  Description: Document Mala Palafox Fall Risk and appropriate interventions in the flowsheet.   8/26/2022 0242 by Gisella Hernandez RN  Outcome: Progressing Towards Goal  Note: Fall Risk Interventions:            Medication Interventions: Patient to call before getting OOB, Teach patient to arise slowly, Evaluate medications/consider consulting pharmacy                8/26/2022 0242 by Gisella Hernandez RN  Outcome: Progressing Towards Goal  Note: Fall Risk Interventions:            Medication Interventions: Patient to call before getting OOB, Teach patient to arise slowly, Evaluate medications/consider consulting pharmacy

## 2022-08-27 ENCOUNTER — APPOINTMENT (OUTPATIENT)
Dept: CT IMAGING | Age: 57
DRG: 330 | End: 2022-08-27
Attending: SURGERY
Payer: COMMERCIAL

## 2022-08-27 ENCOUNTER — ANESTHESIA EVENT (OUTPATIENT)
Dept: SURGERY | Age: 57
DRG: 330 | End: 2022-08-27
Payer: COMMERCIAL

## 2022-08-27 ENCOUNTER — APPOINTMENT (OUTPATIENT)
Dept: CT IMAGING | Age: 57
DRG: 330 | End: 2022-08-27
Attending: NURSE PRACTITIONER
Payer: COMMERCIAL

## 2022-08-27 ENCOUNTER — APPOINTMENT (OUTPATIENT)
Dept: GENERAL RADIOLOGY | Age: 57
DRG: 330 | End: 2022-08-27
Attending: SURGERY
Payer: COMMERCIAL

## 2022-08-27 ENCOUNTER — ANESTHESIA (OUTPATIENT)
Dept: SURGERY | Age: 57
DRG: 330 | End: 2022-08-27
Payer: COMMERCIAL

## 2022-08-27 LAB
ERYTHROCYTE [DISTWIDTH] IN BLOOD BY AUTOMATED COUNT: 13 % (ref 11.5–14.5)
HCT VFR BLD AUTO: 42 % (ref 36.6–50.3)
HGB BLD-MCNC: 14.8 G/DL (ref 12.1–17)
MCH RBC QN AUTO: 37.1 PG (ref 26–34)
MCHC RBC AUTO-ENTMCNC: 35.2 G/DL (ref 30–36.5)
MCV RBC AUTO: 105.3 FL (ref 80–99)
NRBC # BLD: 0 K/UL (ref 0–0.01)
NRBC BLD-RTO: 0 PER 100 WBC
PLATELET # BLD AUTO: 334 K/UL (ref 150–400)
PMV BLD AUTO: 9.3 FL (ref 8.9–12.9)
RBC # BLD AUTO: 3.99 M/UL (ref 4.1–5.7)
WBC # BLD AUTO: 14.9 K/UL (ref 4.1–11.1)

## 2022-08-27 PROCEDURE — 36573 INSJ PICC RS&I 5 YR+: CPT | Performed by: SURGERY

## 2022-08-27 PROCEDURE — 85027 COMPLETE CBC AUTOMATED: CPT

## 2022-08-27 PROCEDURE — 44146 PARTIAL REMOVAL OF COLON: CPT | Performed by: SURGERY

## 2022-08-27 PROCEDURE — 74011000250 HC RX REV CODE- 250: Performed by: SURGERY

## 2022-08-27 PROCEDURE — 74011250636 HC RX REV CODE- 250/636: Performed by: SURGERY

## 2022-08-27 PROCEDURE — 0DTN0ZZ RESECTION OF SIGMOID COLON, OPEN APPROACH: ICD-10-PCS | Performed by: SURGERY

## 2022-08-27 PROCEDURE — 99231 SBSQ HOSP IP/OBS SF/LOW 25: CPT | Performed by: SURGERY

## 2022-08-27 PROCEDURE — 74011000258 HC RX REV CODE- 258: Performed by: SURGERY

## 2022-08-27 PROCEDURE — 36415 COLL VENOUS BLD VENIPUNCTURE: CPT

## 2022-08-27 PROCEDURE — 74177 CT ABD & PELVIS W/CONTRAST: CPT

## 2022-08-27 PROCEDURE — 77030018786 HC NDL GD F/USND BARD -B

## 2022-08-27 PROCEDURE — 74011000636 HC RX REV CODE- 636: Performed by: SURGERY

## 2022-08-27 PROCEDURE — 0DTJ0ZZ RESECTION OF APPENDIX, OPEN APPROACH: ICD-10-PCS | Performed by: SURGERY

## 2022-08-27 PROCEDURE — 02HV33Z INSERTION OF INFUSION DEVICE INTO SUPERIOR VENA CAVA, PERCUTANEOUS APPROACH: ICD-10-PCS | Performed by: SURGERY

## 2022-08-27 PROCEDURE — C1751 CATH, INF, PER/CENT/MIDLINE: HCPCS

## 2022-08-27 PROCEDURE — 74176 CT ABD & PELVIS W/O CONTRAST: CPT

## 2022-08-27 PROCEDURE — 65270000029 HC RM PRIVATE

## 2022-08-27 PROCEDURE — 76937 US GUIDE VASCULAR ACCESS: CPT

## 2022-08-27 PROCEDURE — 74018 RADEX ABDOMEN 1 VIEW: CPT

## 2022-08-27 RX ORDER — HEPARIN 100 UNIT/ML
300 SYRINGE INTRAVENOUS AS NEEDED
Status: DISCONTINUED | OUTPATIENT
Start: 2022-08-27 | End: 2022-09-05 | Stop reason: HOSPADM

## 2022-08-27 RX ADMIN — POTASSIUM CHLORIDE, DEXTROSE MONOHYDRATE AND SODIUM CHLORIDE 125 ML/HR: 150; 5; 450 INJECTION, SOLUTION INTRAVENOUS at 05:56

## 2022-08-27 RX ADMIN — IOPAMIDOL 100 ML: 755 INJECTION, SOLUTION INTRAVENOUS at 11:21

## 2022-08-27 RX ADMIN — METRONIDAZOLE 500 MG: 500 INJECTION, SOLUTION INTRAVENOUS at 22:28

## 2022-08-27 RX ADMIN — IOHEXOL 50 ML: 240 INJECTION, SOLUTION INTRATHECAL; INTRAVASCULAR; INTRAVENOUS; ORAL at 08:52

## 2022-08-27 RX ADMIN — POTASSIUM CHLORIDE, DEXTROSE MONOHYDRATE AND SODIUM CHLORIDE 125 ML/HR: 150; 5; 450 INJECTION, SOLUTION INTRAVENOUS at 13:19

## 2022-08-27 RX ADMIN — METRONIDAZOLE 500 MG: 500 INJECTION, SOLUTION INTRAVENOUS at 15:08

## 2022-08-27 RX ADMIN — LEVOFLOXACIN 750 MG: 5 INJECTION, SOLUTION INTRAVENOUS at 13:19

## 2022-08-27 RX ADMIN — MAGNESIUM SULFATE HEPTAHYDRATE: 500 INJECTION, SOLUTION INTRAMUSCULAR; INTRAVENOUS at 17:35

## 2022-08-27 RX ADMIN — METRONIDAZOLE 500 MG: 500 INJECTION, SOLUTION INTRAVENOUS at 05:56

## 2022-08-27 RX ADMIN — HYDROMORPHONE HYDROCHLORIDE 0.5 MG: 1 INJECTION, SOLUTION INTRAMUSCULAR; INTRAVENOUS; SUBCUTANEOUS at 18:52

## 2022-08-27 RX ADMIN — HYDROMORPHONE HYDROCHLORIDE 0.5 MG: 1 INJECTION, SOLUTION INTRAMUSCULAR; INTRAVENOUS; SUBCUTANEOUS at 04:10

## 2022-08-27 NOTE — PROGRESS NOTES
Problem: Falls - Risk of  Goal: *Absence of Falls  Description: Document Mayra Jarrett Fall Risk and appropriate interventions in the flowsheet.   Outcome: Progressing Towards Goal  Note: Fall Risk Interventions:            Medication Interventions: Evaluate medications/consider consulting pharmacy         History of Falls Interventions: Bed/chair exit alarm

## 2022-08-27 NOTE — PROGRESS NOTES
End of Shift Note    Bedside shift change report given to Grabiel Stewart (oncoming nurse) by Oniel Cochran RN (offgoing nurse). Report included the following information SBAR, Kardex, and MAR    Shift worked: 7pm-7am     Shift summary and any significant changes:    Patient had two episodes of pain requiring IV pain medications. Abdomen distended and tender. Patient passing gas but no bowel movement. NG tube at 65cm mod continuous suction as ordered. 600ml output. Vital signs remained stable. Concerns for physician to address:      Zone phone for oncoming shift:          Activity:  Activity Level: Bath Room Privileges  Number times ambulated in hallways past shift: 0      Cardiac:   Cardiac Monitoring: No        Access:  Current line(s): PIV     Genitourinary:   Urinary status: voiding    Respiratory:   O2 Device: None (Room air)  Chronic home O2 use?: NO  Incentive spirometer at bedside: NO       GI:  Last Bowel Movement Date: 08/23/22  Current diet:  DIET NPO Ice Chips  Passing flatus: YES  Tolerating current diet: YES       Pain Management:   Patient states pain is manageable on current regimen: YES    Skin:  Urban Score: 21  Interventions: speciality bed and increase time out of bed    Patient Safety:  Fall Score:  Total Score: 1  Interventions: assistive device (walker, cane, etc), gripper socks, and pt to call before getting OOB       Length of Stay:  Expected LOS: 2d 14h  Actual LOS: 3      Oniel Cochran RN

## 2022-08-27 NOTE — PROGRESS NOTES
SURGERY PROGRESS NOTE      Admit Date: 2022    Subjective:     Patient states he feels better. Less pain. Still some bloating. Passing flatus and had 2 BMs since starting contrast.       Objective:     Visit Vitals  /77   Pulse 79   Temp 97.9 °F (36.6 °C)   Resp 18   Ht 5' 10\" (1.778 m)   Wt 76.3 kg (168 lb 3.4 oz)   SpO2 95%   BMI 24.14 kg/m²        Temp (24hrs), Av.2 °F (36.8 °C), Min:97.9 °F (36.6 °C), Max:98.9 °F (37.2 °C)      No intake/output data recorded.  1901 -  0700  In: 1625 [I.V.:1625]  Out: 2300 [Urine:600]    Physical Exam:    General:  alert, cooperative, no distress, appears stated age, but hiccupping and belching     Abdomen: soft, distended, tympanic, bowel sounds hypoactive, non-tender           Lab Results   Component Value Date/Time    WBC 14.9 (H) 2022 04:22 AM    HGB 14.8 2022 04:22 AM    HCT 42.0 2022 04:22 AM    PLATELET 909  04:22 AM    .3 (H) 2022 04:22 AM     Lab Results   Component Value Date/Time    GFR est non-AA >60 2022 03:00 AM    GFR est AA >60 2022 03:00 AM    Creatinine 0.98 2022 03:00 AM    BUN 13 2022 03:00 AM    Sodium 133 (L) 2022 03:00 AM    Potassium 4.0 2022 03:00 AM    Chloride 102 2022 03:00 AM    CO2 27 2022 03:00 AM       CT -  result pending but, looks unchanged to me    Assessment/:     Principal Problem:    Diverticulitis of colon with perforation (2022)    61year old with complicated diverticulitis with small contained perforation and associated SBO. He feels better, is passing flatus and had 2 BMs today which is all encouraging. But, on exam he is significantly distended with hiccups, belching and indigestion. CT looks no better but, it is a short interval. Patient has an extensive history of abdominal surgeries including a open component separation to repair a recurrent ventral hernia .   Therefore, the SBO may be adhesion related in addition to the inflammation. With his massive distention, he would require a long laparotomy incision to relieve the obstruction and deal with the sigmoid colon. This would have a negative impact on his hernia repair. He is non-toxic and symptomatically improving. I think it is best to continue with conservative management at this time. Since it has been 4 days in the hospital, I will have PICC line placed and start TPN. Will get and interval plain film tonight to see if there is progression of contrast in the colon. Hopefully, the SBO will resolve so that we can get him to elective colectomy.

## 2022-08-27 NOTE — PROGRESS NOTES
End of Shift Note    Bedside shift change report given to [address] [place] [address] (oncoming nurse) by Jose E Shepard RN (offgoing nurse). Report included the following information SBAR, Kardex, Procedure Summary, Intake/Output, MAR, Accordion, and Recent Results    Shift worked:  7a-7p     Shift summary and any significant changes:     Pts abd very distended, a little firm. +BM x2, +flatus, NGT at 65cm with 850cc output which was brown but now green. Pt very uncomfortable 6/10 pain, pt trying to hold off on pain meds due to concern that it will hinder healing from SBO. Pain med x1 finally at the end of the shift. Pt had CT abd today and has been very active in his room, sitting up a lot and moving around the room a lot. PICC placed to start TPN. Concerns for physician to address:  none     Zone phone for oncoming shift:   [phone]       Activity:  Activity Level: Bath Room Privileges  Number times ambulated in hallways past shift: 0  Number of times OOB to chair past shift: 3    Cardiac:   Cardiac Monitoring: No      Cardiac Rhythm: Sinus Rhythm    Access:  Current line(s): PICC     Genitourinary:   Urinary status: voiding    Respiratory:   O2 Device: None (Room air)  Chronic home O2 use?: NO  Incentive spirometer at bedside: NO       GI:  Last Bowel Movement Date: 08/23/22  Current diet:  DIET NPO Ice Chips  TPN ADULT - CENTRAL  Passing flatus: YES  Tolerating current diet: YES       Pain Management:   Patient states pain is manageable on current regimen: YES    Skin:  Urban Score: 21  Interventions: increase time out of bed    Patient Safety:  Fall Score:  Total Score: 1  Interventions: gripper socks       Length of Stay:  Expected LOS: 2d 14h  Actual LOS: 3      Jose E Shepard, RN

## 2022-08-27 NOTE — PROGRESS NOTES
PICC (Peripherally Inserted Central Catheter) line insertion  procedure note :     Procedure explained to patient along with risks and benefits  and patient agreed to proceed. Informed  written consent obtained from  patient. Patient teaching completed. Timeout completed. Pre-procedure assessment done. Maximum sterile barrier precautions observed throughout procedure. Lidocaine 1%  3    ml sq given prior to cannulation. Cannulated basilic  vein using ultrasound guidance and modified seldinger technique. Inserted 5  Uzbek double  lumen PICC to right upper arm using [employer] Living Tip Location System and  Daily Secretnera 1898. Pt has    sinus   rhythm. PICC tip location was confirmed by 3 CG   tip positioning system, indicating tall P wave and no negative deflection before P wave which would indicate that the PICC tip is properly placed in the distal SVC or at the [place]. PICC tip location was  confirmed by 2 PICC nurses and printout placed on patient's chart. Blood return verified and flushed with 20 ml normal saline in each port. Sterile dressing applied with biopatch, statLock and occlusive dressing as per protocol. Curos caps applied to each port. Patient tolerated procedure well with minimal blood loss ( less than 5 ml.)  PICC procedure performed by  :  Mana MANCERA RN, PICC Nurse. Vascular Access Team.   Assisted by :  Jeffry Peñaloza, PICC Nurse, Vascular Access Team.  Reason for access : TPN infusion. Complications related to insertion  : none  X-Ray : not applicable  Notified primary nurse  Farideh Capps RN  that  PICC line can be used. Trimmed Length :  43   cm   External Length :   0 cm .      PICC line site arm circumference:    28.5    cm   PICC catheter occupies   10   % of vein  Type of PICC: Bard Solo Power PICC    Ref # :  [id number]        Lot # :  [id number]  Expiration Date :  2023/05/31    Mana MANCERA RN, PICC Nurse, Vascular Access Team.

## 2022-08-27 NOTE — PROGRESS NOTES
Comprehensive Nutrition Assessment    Type and Reason for Visit: Initial (new TPN)    Nutrition Recommendations/Plan:   TPN recommendations: Continue TPN as ordered today at 42mL/h  Would keep TPN at 42mL/h Sunday as well  Monday if BG <200mg/dL and lytes WNL, advance to Goal of 63mL/h + 500mL 20% lipids 3 x week (provides 1759kcals/76gPro/302gDextrose)      Malnutrition Assessment:  Malnutrition Status:  Insufficient data (08/27/22 1353)      Nutrition Assessment:  Pt admitted with diverticulitis of colon. PMH: previous abdominal surgeries? Chart reviewed, PICC team in with pt at time of attempted visit. NGT to suction with 1400mL OP yesterday. Concern for SBO, surgeon following. NPO day 4. MST not filled out and no wt hx available in EMR. Will need to determine malnutrition status upon F/U. Provided TPN recommendations above which would provide 23kcals/kg and meets 100% protein needs once at goal.         Nutrition Related Findings:    Meds: levaquin, flagyl, KCl, D5, [email or web address]. BM 8/23 Wound Type: None    Current Nutrition Intake & Therapies:  Average Meal Intake: NPO     DIET NPO Ice Chips  TPN ADULT - CENTRAL    Anthropometric Measures:  Height: 5' 10\" (177.8 cm)  Ideal Body Weight (IBW): 166 lbs (75 kg)     Current Body Wt:  76.3 kg (168 lb 3.4 oz), 101.3 % IBW. Standing scale  Current BMI (kg/m2): 24.1                          BMI Category: Normal weight (BMI 18.5-24. 9)    Estimated Daily Nutrient Needs:  Energy Requirements Based On: Formula  Weight Used for Energy Requirements: Current  Energy (kcal/day): MSJ 2050 (1590 x 1.3)  Weight Used for Protein Requirements: Current  Protein (g/day): 76g (1gPro/kg)  Method Used for Fluid Requirements: 1 ml/kcal  Fluid (ml/day): 2050mL    Nutrition Diagnosis:   Altered GI function related to altered GI function, altered GI structure as evidenced by NPO or clear liquid status due to medical condition    Nutrition Interventions:   Food and/or Nutrient Delivery: Start parenteral nutrition, Modify parenteral nutrition  Nutrition Education/Counseling: No recommendations at this time  Coordination of Nutrition Care: Continue to monitor while inpatient       Goals:     Goals: Initiate nutrition support, by next RD assessment (with BG <200mg/dL and lytes WNL)       Nutrition Monitoring and Evaluation:   Behavioral-Environmental Outcomes: None identified  Food/Nutrient Intake Outcomes: Parenteral nutrition intake/tolerance  Physical Signs/Symptoms Outcomes: Biochemical data, Nutrition focused physical findings, Skin, Weight, GI status    Discharge Planning:     Too soon to determine    Edward Coronado RD, CNSC  Contact: ext [phone]

## 2022-08-28 LAB
ANION GAP SERPL CALC-SCNC: 5 MMOL/L (ref 5–15)
BUN SERPL-MCNC: 12 MG/DL (ref 6–20)
BUN/CREAT SERPL: 12 (ref 12–20)
CALCIUM SERPL-MCNC: 8.1 MG/DL (ref 8.5–10.1)
CHLORIDE SERPL-SCNC: 103 MMOL/L (ref 97–108)
CO2 SERPL-SCNC: 26 MMOL/L (ref 21–32)
CREAT SERPL-MCNC: 1 MG/DL (ref 0.7–1.3)
ERYTHROCYTE [DISTWIDTH] IN BLOOD BY AUTOMATED COUNT: 13.1 % (ref 11.5–14.5)
GLUCOSE SERPL-MCNC: 194 MG/DL (ref 65–100)
HCT VFR BLD AUTO: 42.7 % (ref 36.6–50.3)
HGB BLD-MCNC: 14.6 G/DL (ref 12.1–17)
MAGNESIUM SERPL-MCNC: 2.1 MG/DL (ref 1.6–2.4)
MCH RBC QN AUTO: 36.5 PG (ref 26–34)
MCHC RBC AUTO-ENTMCNC: 34.2 G/DL (ref 30–36.5)
MCV RBC AUTO: 106.8 FL (ref 80–99)
NRBC # BLD: 0 K/UL (ref 0–0.01)
NRBC BLD-RTO: 0 PER 100 WBC
PHOSPHATE SERPL-MCNC: 3.6 MG/DL (ref 2.6–4.7)
PLATELET # BLD AUTO: 318 K/UL (ref 150–400)
PMV BLD AUTO: 9 FL (ref 8.9–12.9)
POTASSIUM SERPL-SCNC: 4 MMOL/L (ref 3.5–5.1)
RBC # BLD AUTO: 4 M/UL (ref 4.1–5.7)
SODIUM SERPL-SCNC: 134 MMOL/L (ref 136–145)
WBC # BLD AUTO: 19.1 K/UL (ref 4.1–11.1)

## 2022-08-28 PROCEDURE — 74011250636 HC RX REV CODE- 250/636: Performed by: ANESTHESIOLOGY

## 2022-08-28 PROCEDURE — 76060000036 HC ANESTHESIA 2.5 TO 3 HR: Performed by: SURGERY

## 2022-08-28 PROCEDURE — 77030011264 HC ELECTRD BLD EXT COVD -A: Performed by: SURGERY

## 2022-08-28 PROCEDURE — 77030026438 HC STYL ET INTUB CARD -A: Performed by: NURSE ANESTHETIST, CERTIFIED REGISTERED

## 2022-08-28 PROCEDURE — 77030020061 HC IV BLD WRMR ADMIN SET 3M -B: Performed by: ANESTHESIOLOGY

## 2022-08-28 PROCEDURE — 88304 TISSUE EXAM BY PATHOLOGIST: CPT

## 2022-08-28 PROCEDURE — 77030012407 HC DRN WND BARD -B: Performed by: SURGERY

## 2022-08-28 PROCEDURE — 76210000006 HC OR PH I REC 0.5 TO 1 HR: Performed by: SURGERY

## 2022-08-28 PROCEDURE — 77030011278 HC ELECTRD LIG IMPT COVD -F: Performed by: SURGERY

## 2022-08-28 PROCEDURE — 77030038692 HC WND DEB SYS IRMX -B: Performed by: SURGERY

## 2022-08-28 PROCEDURE — 80048 BASIC METABOLIC PNL TOTAL CA: CPT

## 2022-08-28 PROCEDURE — 74011000250 HC RX REV CODE- 250: Performed by: NURSE ANESTHETIST, CERTIFIED REGISTERED

## 2022-08-28 PROCEDURE — 88305 TISSUE EXAM BY PATHOLOGIST: CPT

## 2022-08-28 PROCEDURE — 74011250636 HC RX REV CODE- 250/636: Performed by: SURGERY

## 2022-08-28 PROCEDURE — 77030002996 HC SUT SLK J&J -A: Performed by: SURGERY

## 2022-08-28 PROCEDURE — 77030005513 HC CATH URETH FOL11 MDII -B: Performed by: SURGERY

## 2022-08-28 PROCEDURE — 74011250637 HC RX REV CODE- 250/637: Performed by: SURGERY

## 2022-08-28 PROCEDURE — 77030035236 HC SUT PDS STRATFX BARB J&J -B: Performed by: SURGERY

## 2022-08-28 PROCEDURE — 74011250636 HC RX REV CODE- 250/636

## 2022-08-28 PROCEDURE — 74011000250 HC RX REV CODE- 250: Performed by: SURGERY

## 2022-08-28 PROCEDURE — 74011250636 HC RX REV CODE- 250/636: Performed by: NURSE ANESTHETIST, CERTIFIED REGISTERED

## 2022-08-28 PROCEDURE — 65270000029 HC RM PRIVATE

## 2022-08-28 PROCEDURE — 84100 ASSAY OF PHOSPHORUS: CPT

## 2022-08-28 PROCEDURE — 77030002916 HC SUT ETHLN J&J -A: Performed by: SURGERY

## 2022-08-28 PROCEDURE — 36415 COLL VENOUS BLD VENIPUNCTURE: CPT

## 2022-08-28 PROCEDURE — 77030019908 HC STETH ESOPH SIMS -A: Performed by: NURSE ANESTHETIST, CERTIFIED REGISTERED

## 2022-08-28 PROCEDURE — 74011000258 HC RX REV CODE- 258: Performed by: SURGERY

## 2022-08-28 PROCEDURE — 77030008462 HC STPLR SKN PROX J&J -A: Performed by: SURGERY

## 2022-08-28 PROCEDURE — 77030008684 HC TU ET CUF COVD -B: Performed by: NURSE ANESTHETIST, CERTIFIED REGISTERED

## 2022-08-28 PROCEDURE — 77030009968 HC RELD STPLR ENDOSC J&J -D: Performed by: SURGERY

## 2022-08-28 PROCEDURE — 2709999900 HC NON-CHARGEABLE SUPPLY: Performed by: SURGERY

## 2022-08-28 PROCEDURE — 76010000132 HC OR TIME 2.5 TO 3 HR: Performed by: SURGERY

## 2022-08-28 PROCEDURE — 77030011808 HC STPLR ENDOSCOPIC J&J -D: Performed by: SURGERY

## 2022-08-28 PROCEDURE — 85027 COMPLETE CBC AUTOMATED: CPT

## 2022-08-28 PROCEDURE — 88307 TISSUE EXAM BY PATHOLOGIST: CPT

## 2022-08-28 PROCEDURE — 83735 ASSAY OF MAGNESIUM: CPT

## 2022-08-28 RX ORDER — FENTANYL CITRATE 50 UG/ML
INJECTION, SOLUTION INTRAMUSCULAR; INTRAVENOUS AS NEEDED
Status: DISCONTINUED | OUTPATIENT
Start: 2022-08-28 | End: 2022-08-28 | Stop reason: HOSPADM

## 2022-08-28 RX ORDER — SODIUM CHLORIDE, SODIUM LACTATE, POTASSIUM CHLORIDE, CALCIUM CHLORIDE 600; 310; 30; 20 MG/100ML; MG/100ML; MG/100ML; MG/100ML
25 INJECTION, SOLUTION INTRAVENOUS CONTINUOUS
Status: DISCONTINUED | OUTPATIENT
Start: 2022-08-28 | End: 2022-08-28 | Stop reason: HOSPADM

## 2022-08-28 RX ORDER — FENTANYL CITRATE 50 UG/ML
INJECTION, SOLUTION INTRAMUSCULAR; INTRAVENOUS
Status: COMPLETED
Start: 2022-08-28 | End: 2022-08-28

## 2022-08-28 RX ORDER — SODIUM CHLORIDE, SODIUM LACTATE, POTASSIUM CHLORIDE, CALCIUM CHLORIDE 600; 310; 30; 20 MG/100ML; MG/100ML; MG/100ML; MG/100ML
INJECTION, SOLUTION INTRAVENOUS
Status: DISCONTINUED | OUTPATIENT
Start: 2022-08-28 | End: 2022-08-28 | Stop reason: HOSPADM

## 2022-08-28 RX ORDER — LIDOCAINE HYDROCHLORIDE 10 MG/ML
0.1 INJECTION, SOLUTION EPIDURAL; INFILTRATION; INTRACAUDAL; PERINEURAL AS NEEDED
Status: DISCONTINUED | OUTPATIENT
Start: 2022-08-28 | End: 2022-08-28 | Stop reason: HOSPADM

## 2022-08-28 RX ORDER — LIDOCAINE HYDROCHLORIDE 20 MG/ML
INJECTION, SOLUTION EPIDURAL; INFILTRATION; INTRACAUDAL; PERINEURAL AS NEEDED
Status: DISCONTINUED | OUTPATIENT
Start: 2022-08-28 | End: 2022-08-28 | Stop reason: HOSPADM

## 2022-08-28 RX ORDER — ROCURONIUM BROMIDE 10 MG/ML
INJECTION, SOLUTION INTRAVENOUS AS NEEDED
Status: DISCONTINUED | OUTPATIENT
Start: 2022-08-28 | End: 2022-08-28 | Stop reason: HOSPADM

## 2022-08-28 RX ORDER — NEOSTIGMINE METHYLSULFATE 1 MG/ML
INJECTION, SOLUTION INTRAVENOUS AS NEEDED
Status: DISCONTINUED | OUTPATIENT
Start: 2022-08-28 | End: 2022-08-28 | Stop reason: HOSPADM

## 2022-08-28 RX ORDER — FENTANYL CITRATE 50 UG/ML
25 INJECTION, SOLUTION INTRAMUSCULAR; INTRAVENOUS
Status: DISCONTINUED | OUTPATIENT
Start: 2022-08-28 | End: 2022-08-28 | Stop reason: HOSPADM

## 2022-08-28 RX ORDER — HYDROMORPHONE HYDROCHLORIDE 1 MG/ML
.2-.5 INJECTION, SOLUTION INTRAMUSCULAR; INTRAVENOUS; SUBCUTANEOUS
Status: DISCONTINUED | OUTPATIENT
Start: 2022-08-28 | End: 2022-08-28 | Stop reason: HOSPADM

## 2022-08-28 RX ORDER — ONDANSETRON 2 MG/ML
INJECTION INTRAMUSCULAR; INTRAVENOUS AS NEEDED
Status: DISCONTINUED | OUTPATIENT
Start: 2022-08-28 | End: 2022-08-28 | Stop reason: HOSPADM

## 2022-08-28 RX ORDER — PHENYLEPHRINE HCL IN 0.9% NACL 0.4MG/10ML
SYRINGE (ML) INTRAVENOUS AS NEEDED
Status: DISCONTINUED | OUTPATIENT
Start: 2022-08-28 | End: 2022-08-28 | Stop reason: HOSPADM

## 2022-08-28 RX ORDER — ONDANSETRON 2 MG/ML
4 INJECTION INTRAMUSCULAR; INTRAVENOUS AS NEEDED
Status: DISCONTINUED | OUTPATIENT
Start: 2022-08-28 | End: 2022-08-28 | Stop reason: HOSPADM

## 2022-08-28 RX ORDER — PROPOFOL 10 MG/ML
INJECTION, EMULSION INTRAVENOUS AS NEEDED
Status: DISCONTINUED | OUTPATIENT
Start: 2022-08-28 | End: 2022-08-28 | Stop reason: HOSPADM

## 2022-08-28 RX ORDER — DEXAMETHASONE SODIUM PHOSPHATE 4 MG/ML
INJECTION, SOLUTION INTRA-ARTICULAR; INTRALESIONAL; INTRAMUSCULAR; INTRAVENOUS; SOFT TISSUE AS NEEDED
Status: DISCONTINUED | OUTPATIENT
Start: 2022-08-28 | End: 2022-08-28 | Stop reason: HOSPADM

## 2022-08-28 RX ORDER — MIDAZOLAM HYDROCHLORIDE 1 MG/ML
1 INJECTION, SOLUTION INTRAMUSCULAR; INTRAVENOUS AS NEEDED
Status: DISCONTINUED | OUTPATIENT
Start: 2022-08-28 | End: 2022-08-28 | Stop reason: HOSPADM

## 2022-08-28 RX ORDER — SUCCINYLCHOLINE CHLORIDE 20 MG/ML
INJECTION INTRAMUSCULAR; INTRAVENOUS AS NEEDED
Status: DISCONTINUED | OUTPATIENT
Start: 2022-08-28 | End: 2022-08-28 | Stop reason: HOSPADM

## 2022-08-28 RX ORDER — FENTANYL CITRATE 50 UG/ML
50 INJECTION, SOLUTION INTRAMUSCULAR; INTRAVENOUS AS NEEDED
Status: DISCONTINUED | OUTPATIENT
Start: 2022-08-28 | End: 2022-08-28 | Stop reason: HOSPADM

## 2022-08-28 RX ORDER — CEFAZOLIN SODIUM 1 G/3ML
INJECTION, POWDER, FOR SOLUTION INTRAMUSCULAR; INTRAVENOUS AS NEEDED
Status: DISCONTINUED | OUTPATIENT
Start: 2022-08-28 | End: 2022-08-28 | Stop reason: HOSPADM

## 2022-08-28 RX ORDER — HYDROMORPHONE HYDROCHLORIDE 2 MG/ML
INJECTION, SOLUTION INTRAMUSCULAR; INTRAVENOUS; SUBCUTANEOUS AS NEEDED
Status: DISCONTINUED | OUTPATIENT
Start: 2022-08-28 | End: 2022-08-28 | Stop reason: HOSPADM

## 2022-08-28 RX ORDER — GLYCOPYRROLATE 0.2 MG/ML
INJECTION INTRAMUSCULAR; INTRAVENOUS AS NEEDED
Status: DISCONTINUED | OUTPATIENT
Start: 2022-08-28 | End: 2022-08-28 | Stop reason: HOSPADM

## 2022-08-28 RX ORDER — MIDAZOLAM HYDROCHLORIDE 1 MG/ML
INJECTION, SOLUTION INTRAMUSCULAR; INTRAVENOUS AS NEEDED
Status: DISCONTINUED | OUTPATIENT
Start: 2022-08-28 | End: 2022-08-28 | Stop reason: HOSPADM

## 2022-08-28 RX ORDER — KETAMINE HYDROCHLORIDE 10 MG/ML
INJECTION, SOLUTION INTRAMUSCULAR; INTRAVENOUS AS NEEDED
Status: DISCONTINUED | OUTPATIENT
Start: 2022-08-28 | End: 2022-08-28 | Stop reason: HOSPADM

## 2022-08-28 RX ADMIN — METRONIDAZOLE 500 MG: 500 INJECTION, SOLUTION INTRAVENOUS at 21:57

## 2022-08-28 RX ADMIN — ROCURONIUM BROMIDE 50 MG: 10 INJECTION INTRAVENOUS at 00:28

## 2022-08-28 RX ADMIN — Medication 1 AMPULE: at 22:13

## 2022-08-28 RX ADMIN — METRONIDAZOLE 500 MG: 500 INJECTION, SOLUTION INTRAVENOUS at 06:51

## 2022-08-28 RX ADMIN — DEXAMETHASONE SODIUM PHOSPHATE 8 MG: 4 INJECTION, SOLUTION INTRAMUSCULAR; INTRAVENOUS at 00:28

## 2022-08-28 RX ADMIN — FENTANYL CITRATE 100 MCG: 50 INJECTION, SOLUTION INTRAMUSCULAR; INTRAVENOUS at 00:08

## 2022-08-28 RX ADMIN — FENTANYL CITRATE 25 MCG: 50 INJECTION, SOLUTION INTRAMUSCULAR; INTRAVENOUS at 03:15

## 2022-08-28 RX ADMIN — SODIUM CHLORIDE, POTASSIUM CHLORIDE, SODIUM LACTATE AND CALCIUM CHLORIDE: 600; 310; 30; 20 INJECTION, SOLUTION INTRAVENOUS at 00:08

## 2022-08-28 RX ADMIN — FENTANYL CITRATE 25 MCG: 50 INJECTION, SOLUTION INTRAMUSCULAR; INTRAVENOUS at 03:06

## 2022-08-28 RX ADMIN — FENTANYL CITRATE 25 MCG: 50 INJECTION, SOLUTION INTRAMUSCULAR; INTRAVENOUS at 03:10

## 2022-08-28 RX ADMIN — GLYCOPYRROLATE 0.4 MG: 0.2 INJECTION, SOLUTION INTRAMUSCULAR; INTRAVENOUS at 02:24

## 2022-08-28 RX ADMIN — Medication: at 19:24

## 2022-08-28 RX ADMIN — HYDROMORPHONE HYDROCHLORIDE 1 MG: 2 INJECTION, SOLUTION INTRAMUSCULAR; INTRAVENOUS; SUBCUTANEOUS at 01:04

## 2022-08-28 RX ADMIN — SUCCINYLCHOLINE CHLORIDE 160 MG: 20 INJECTION, SOLUTION INTRAMUSCULAR; INTRAVENOUS at 00:14

## 2022-08-28 RX ADMIN — Medication: at 10:20

## 2022-08-28 RX ADMIN — MAGNESIUM SULFATE HEPTAHYDRATE: 500 INJECTION, SOLUTION INTRAMUSCULAR; INTRAVENOUS at 17:48

## 2022-08-28 RX ADMIN — POTASSIUM CHLORIDE, DEXTROSE MONOHYDRATE AND SODIUM CHLORIDE 125 ML/HR: 150; 5; 450 INJECTION, SOLUTION INTRAVENOUS at 13:44

## 2022-08-28 RX ADMIN — Medication 200 MCG: at 00:37

## 2022-08-28 RX ADMIN — PROPOFOL 20 MG: 10 INJECTION, EMULSION INTRAVENOUS at 00:14

## 2022-08-28 RX ADMIN — METRONIDAZOLE 500 MG: 500 INJECTION, SOLUTION INTRAVENOUS at 13:50

## 2022-08-28 RX ADMIN — KETAMINE HYDROCHLORIDE 50 MG: 10 INJECTION, SOLUTION INTRAMUSCULAR; INTRAVENOUS at 00:31

## 2022-08-28 RX ADMIN — FENTANYL CITRATE 25 MCG: 50 INJECTION, SOLUTION INTRAMUSCULAR; INTRAVENOUS at 03:19

## 2022-08-28 RX ADMIN — LIDOCAINE HYDROCHLORIDE 100 MG: 20 INJECTION, SOLUTION EPIDURAL; INFILTRATION; INTRACAUDAL; PERINEURAL at 00:11

## 2022-08-28 RX ADMIN — SODIUM CHLORIDE 40 MCG/MIN: 900 INJECTION, SOLUTION INTRAVENOUS at 00:41

## 2022-08-28 RX ADMIN — LEVOFLOXACIN 750 MG: 5 INJECTION, SOLUTION INTRAVENOUS at 13:50

## 2022-08-28 RX ADMIN — HYDROMORPHONE HYDROCHLORIDE 0.5 MG: 1 INJECTION, SOLUTION INTRAMUSCULAR; INTRAVENOUS; SUBCUTANEOUS at 05:46

## 2022-08-28 RX ADMIN — HYDROMORPHONE HYDROCHLORIDE 1 MG: 2 INJECTION, SOLUTION INTRAMUSCULAR; INTRAVENOUS; SUBCUTANEOUS at 02:12

## 2022-08-28 RX ADMIN — POTASSIUM CHLORIDE, DEXTROSE MONOHYDRATE AND SODIUM CHLORIDE 125 ML/HR: 150; 5; 450 INJECTION, SOLUTION INTRAVENOUS at 02:55

## 2022-08-28 RX ADMIN — Medication 120 MCG: at 00:19

## 2022-08-28 RX ADMIN — Medication 3 MG: at 02:24

## 2022-08-28 RX ADMIN — ONDANSETRON HYDROCHLORIDE 4 MG: 2 INJECTION, SOLUTION INTRAMUSCULAR; INTRAVENOUS at 02:16

## 2022-08-28 RX ADMIN — MIDAZOLAM HYDROCHLORIDE 2 MG: 1 INJECTION, SOLUTION INTRAMUSCULAR; INTRAVENOUS at 00:08

## 2022-08-28 RX ADMIN — Medication 1 AMPULE: at 09:50

## 2022-08-28 RX ADMIN — HYDROMORPHONE HYDROCHLORIDE 0.5 MG: 1 INJECTION, SOLUTION INTRAMUSCULAR; INTRAVENOUS; SUBCUTANEOUS at 07:53

## 2022-08-28 RX ADMIN — CEFAZOLIN 2 G: 1 INJECTION, POWDER, FOR SOLUTION INTRAMUSCULAR; INTRAVENOUS; PARENTERAL at 00:28

## 2022-08-28 RX ADMIN — HYDROMORPHONE HYDROCHLORIDE 0.5 MG: 1 INJECTION, SOLUTION INTRAMUSCULAR; INTRAVENOUS; SUBCUTANEOUS at 09:48

## 2022-08-28 NOTE — PROGRESS NOTES
Subjective:      Patient states he feels better tonight with less pain and bloating feels decreased. Objective:      Visit Vitals  BP (!) 140/79   Pulse 89   Temp 97.3 °F (36.3 °C)   Resp 17   Ht 5' 10\" (1.778 m)   Wt 76.3 kg (168 lb 3.4 oz)   SpO2 97%   BMI 24.14 kg/m²       Physical Exam:  GENERAL: alert, cooperative, no distress, appears stated age, LUNG: clear to auscultation bilaterally, HEART: regular rate and rhythm, S1, S2 normal, no murmur, click, rub or gallop, ABDOMEN: soft, distended, tender without clear peritoneal signs. Bowel sounds hypoactive. Imaging:      KUB done to assess progression of PO contrast suggest new free air     Dry CT shows small amount of new free pneumoperitoneum,  There is increased inflammation in the RLQ with increased loculated extraluminal air. Sigmoid inflammation and loculated air unchanged. Source is unclear. Given progressive inflammation of distal ileum it is possible this is the source. Assessment:     61year old male admitted with complicated diverticulitis with contained perforation and SBO. CT this morning showed some worsening of the SB inflammation but stable sigmoid colon findings. Repeat films this evening show new free air and worsening inflammation in the RLQ. Unclear if it is a SB perforation or conversion of the contained sigmoid perforation to a free perforation. Recommend exploratory laparotomy due to risk of sepsis and clinical decline. Plan:     1. I recommend proceeding with exploratory laparotomy with possible small bowel resection, sigmoid colectomy with possible colostomy. 2. Discussed aspects of surgical intervention, methods, risks (including by not limited to infection, bleeding, hematoma, anastomotic leak, recurrent hernia formation, contamination of indwelling hernia mesh, Injury to abdominal organs and perforation of the intestines or solid organs), and the risks of general anesthetic.   The patient understands the risks; any and all questions were answered to the patient's satisfaction. 3. Patient does wish to proceed with surgery.

## 2022-08-28 NOTE — ANESTHESIA POSTPROCEDURE EVALUATION
Procedure(s):  LAPAROTOMY EXPLORATORY, SIGMOID COLECTOMY, APPENDECTOMY. No value filed. Anesthesia Post Evaluation      Multimodal analgesia: multimodal analgesia used between 6 hours prior to anesthesia start to PACU discharge  Patient location during evaluation: PACU  Level of consciousness: sleepy but conscious  Pain management: adequate  Airway patency: patent  Anesthetic complications: no  Cardiovascular status: acceptable  Respiratory status: acceptable  Hydration status: acceptable  Comments: +Post-Anesthesia Evaluation and Assessment    Patient: Izabella Tapia MRN: [id number]  SSN: [id number]   YOB: 1965  Age: 61 y.o. Sex: male      Cardiovascular Function/Vital Signs    /83   Pulse 87   Temp 36.7 °C (98 °F)   Resp 15   Ht 5' 10\" (1.778 m)   Wt 76.3 kg (168 lb 3.4 oz)   SpO2 96%   BMI 24.14 kg/m²     Patient is status post Procedure(s):  LAPAROTOMY EXPLORATORY, SIGMOID COLECTOMY, APPENDECTOMY. Nausea/Vomiting: Controlled. Postoperative hydration reviewed and adequate. Pain:  Pain Scale 1: Numeric (0 - 10) (08/28/22 0243)  Pain Intensity 1: 2 (08/28/22 0243)   Managed. Neurological Status:   Neuro (WDL): Exceptions to WDL (08/28/22 0243)   At baseline. Mental Status and Level of Consciousness: Arousable. Pulmonary Status:   O2 Device: Nasal cannula (08/28/22 0243)   Adequate oxygenation and airway patent. Complications related to anesthesia: None    Post-anesthesia assessment completed. No concerns. Signed By: Dale Dejesus MD    8/28/2022  Post anesthesia nausea and vomiting:  controlled  Final Post Anesthesia Temperature Assessment:  Normothermia (36.0-37.5 degrees C)      INITIAL Post-op Vital signs:   Vitals Value Taken Time   /84 08/28/22 0315   Temp 36.7 °C (98 °F) 08/28/22 0243   Pulse 89 08/28/22 0316   Resp 17 08/28/22 0316   SpO2 96 % 08/28/22 0316   Vitals shown include unvalidated device data.

## 2022-08-28 NOTE — PROGRESS NOTES
End of Shift Note    Bedside shift change report given to Ozzy HAMMER JOSE Pierre LPN (oncoming nurse) by Laney Jimenez RN (offgoing nurse). Report included the following information SBAR, Kardex, Procedure Summary, Intake/Output, MAR, Accordion, and Recent Results    Shift worked:  7a-7p     Shift summary and any significant changes:    Patient in increased pain, PCA pump started. PCA pump appears to be helping pt with his pain. Pt states he wants to walk the hallways but can't until his pain is more under control and is planning to in the morning. Concerns for physician to address:  none     Zone phone for oncoming shift:   [phone]       Activity:  Activity Level: Up ad peggy  Number times ambulated in hallways past shift: 0  Number of times OOB to chair past shift: 0    Cardiac:   Cardiac Monitoring: No      Cardiac Rhythm: Sinus Rhythm    Access:  Current line(s): PICC     Genitourinary:   Urinary status: voiding    Respiratory:   O2 Device: Nasal cannula  Chronic home O2 use?: NO  Incentive spirometer at bedside: NO       GI:  Last Bowel Movement Date: 08/23/22  Current diet:  DIET NPO Ice Chips  TPN ADULT - CENTRAL  TPN ADULT - CENTRAL  Passing flatus: YES  Tolerating current diet: YES       Pain Management:   Patient states pain is manageable on current regimen: YES    Skin:  Urban Score: 20  Interventions: increase time out of bed    Patient Safety:  Fall Score:  Total Score: 1  Interventions: gripper socks       Length of Stay:  Expected LOS: 2d 14h  Actual LOS: 4      Laney Jimenez RN

## 2022-08-28 NOTE — ANESTHESIA PREPROCEDURE EVALUATION
Relevant Problems   No relevant active problems       Anesthetic History   No history of anesthetic complications            Review of Systems / Medical History  Patient summary reviewed, nursing notes reviewed and pertinent labs reviewed    Pulmonary  Within defined limits                 Neuro/Psych   Within defined limits           Cardiovascular  Within defined limits                Exercise tolerance: >4 METS     GI/Hepatic/Renal  Within defined limits              Endo/Other  Within defined limits           Other Findings   Comments: Diverticulitis of colon with perforation          Physical Exam    Airway  Mallampati: II  TM Distance: 4 - 6 cm  Neck ROM: normal range of motion   Mouth opening: Normal     Cardiovascular  Regular rate and rhythm,  S1 and S2 normal,  no murmur, click, rub, or gallop  Rhythm: regular  Rate: normal         Dental  No notable dental hx       Pulmonary  Breath sounds clear to auscultation               Abdominal  GI exam deferred       Other Findings            Anesthetic Plan    ASA: 2, emergent  Anesthesia type: general    Monitoring Plan: BIS      Induction: Intravenous  Anesthetic plan and risks discussed with: Patient

## 2022-08-28 NOTE — PROGRESS NOTES
End of Shift Note    Bedside shift change report given to [address] (oncoming nurse) by Stef Blanchard LPN (offgoing nurse). Report included the following information SBAR, Intake/Output, MAR, Accordion, and Recent Results    Shift worked:  7p-7a     Shift summary and any significant changes:     Pt notified by MD regarding XR results, Consent form signed and CHG bath done. Ng tube is draining 650 ml of green fluids. Sent to OR 11:53pm, waiting for pt to return. Abdomen is distended and tender to touch   Pt returned to unit @0330    Concerns for physician to address:  none     Zone phone for oncoming shift:  [phone]       Activity:  Activity Level: Up ad peggy  Number times ambulated in hallways past shift: 0  Number of times OOB to chair past shift: 0    Cardiac:   Cardiac Monitoring: No      Cardiac Rhythm: Sinus Rhythm    Access:  Current line(s): PICC     Genitourinary:   Urinary status: voiding    Respiratory:   O2 Device: None (Room air)  Chronic home O2 use?: NO  Incentive spirometer at bedside: NO       GI:  Last Bowel Movement Date: 08/23/22  Current diet:  DIET NPO Ice Chips  TPN ADULT - CENTRAL  Passing flatus: YES  Tolerating current diet: YES       Pain Management:   Patient states pain is manageable on current regimen: N/A    Skin:  Urban Score: 20  Interventions: increase time out of bed    Patient Safety:  Fall Score:  Total Score: 1  Interventions: gripper socks       Length of Stay:  Expected LOS: 2d 14h  Actual LOS: [address], LPN

## 2022-08-28 NOTE — PROGRESS NOTES
Problem: Falls - Risk of  Goal: *Absence of Falls  Description: Document [place] Flow Fall Risk and appropriate interventions in the flowsheet.   Outcome: Progressing Towards Goal  Note: Fall Risk Interventions:            Medication Interventions: Teach patient to arise slowly         History of Falls Interventions: Bed/chair exit alarm

## 2022-08-28 NOTE — PROGRESS NOTES
Problem: Falls - Risk of  Goal: *Absence of Falls  Description: Document David Parker Fall Risk and appropriate interventions in the flowsheet.   Outcome: Progressing Towards Goal  Note: Fall Risk Interventions:            Medication Interventions: Patient to call before getting OOB         History of Falls Interventions: Bed/chair exit alarm

## 2022-08-28 NOTE — PERIOP NOTES
Irrisept Wound Debridement and Cleansing System  Ref: Yueyudelka Undewrood: [id number] LOT: [id number] Expiration Date: 05/31/2024

## 2022-08-28 NOTE — BRIEF OP NOTE
Brief Postoperative Note    Patient: Hilda Crooks  YOB: 1965  MRN: [id number]    Date of Procedure: 8/27/2022     Pre-Op Diagnosis: Perforated diverticulitis sigmoid colon ([facility] [address] [K63.1]    Post-Op Diagnosis: Same as preoperative diagnosis. Procedure(s):  LAPAROTOMY EXPLORATORY, SIGMOID COLECTOMY, APPENDECTOMY    Surgeon(s):  Alonzo Smith MD    Surgical Assistant: Surg Asst-1: Mariusz Wing    Anesthesia: General     Estimated Blood Loss (mL): less than 530     Complications: None    Specimens:   ID Type Source Tests Collected by Time Destination   1 : Proximal donut Preservative Colon, Recto-sigmoid  Alonzo Smith MD 8/28/2022 0132 Pathology   2 : Distal donut Preservative Rectal  Alonzo Smith MD 8/28/2022 0133 Pathology   3 : Recto-Sigmoid colon Preservative Colon, Recto-sigmoid  Alonzo Smith MD 8/28/2022 0136 Pathology   4 : Appendix Preservative Appendix  Alonzo Smith MD 8/28/2022 7821 Pathology        Implants: * No implants in log *    Drains:   Penrose Drain 08/28/22 Right; Lower Abdomen (Active)       Dinesh-Shabazz Drain 08/28/22 Left;Mid Abdomen (Active)       Nasogastric Tube 08/24/22 (Active)   Site Assessment Clean, dry, & intact 08/27/22 1930   Securement Device Adhesive-based child 08/27/22 1930   G Port Status Continuous Suction 08/27/22 1930   External Insertion Rolando (cms) 65 cms 08/27/22 1930   Action Taken Placement verified (comment) 08/27/22 1930   Drainage Description Green 08/27/22 1930   Intake (ml) 0 ml 08/25/22 0700   Medication Volume 0 ml 08/25/22 0700   Output (ml) 500 ml 08/27/22 1930       Findings: 1) dilated, fluid filled loops of small bowel  2)transition in the pelvis due to inflammatory adhesions 3) no small bowel perforation 4) no gross contamination of the abdomen 4) severe inflammation of the sigmoid colon and its mesentery.   No clear site of perforation visible 5) 29mm EEA colorectal anastomosis 6) intact donuts 7) on proctoscopy there was a few bubbles seen. The area was over sewn with 3-0 silk Lambert suture.   Repeat proctoscopy showed no bubbling     Electronically Signed by Devora Almaraz MD on 8/28/2022 at 2:36 AM

## 2022-08-28 NOTE — PERIOP NOTES
TRANSFER - IN REPORT:    Verbal report received from Claudia(name) on Andrade Newer  being received from [facility](unit) for ordered procedure      Report consisted of patients Situation, Background, Assessment and   Recommendations(SBAR). Information from the following report(s) SBAR, Recent Results, Procedure Verification, and Quality Measures was reviewed with the receiving nurse. Opportunity for questions and clarification was provided. Assessment completed upon patients arrival to unit and care assumed.

## 2022-08-28 NOTE — PERIOP NOTES
TRANSFER - OUT REPORT:    Verbal report given to Dontrell Hathaway LPN(name) on [facility]  being transferred to [facility](unit) for routine post - op       Report consisted of patients Situation, Background, Assessment and   Recommendations(SBAR). Information from the following report(s) SBAR, Kardex, OR Summary, Intake/Output, and MAR was reviewed with the receiving nurse. Opportunity for questions and clarification was provided.       Patient transported with:   O2 @ 2 liters  Registered Nurse

## 2022-08-29 LAB
ANION GAP SERPL CALC-SCNC: 5 MMOL/L (ref 5–15)
BUN SERPL-MCNC: 11 MG/DL (ref 6–20)
BUN/CREAT SERPL: 13 (ref 12–20)
CALCIUM SERPL-MCNC: 8.1 MG/DL (ref 8.5–10.1)
CHLORIDE SERPL-SCNC: 102 MMOL/L (ref 97–108)
CO2 SERPL-SCNC: 26 MMOL/L (ref 21–32)
CREAT SERPL-MCNC: 0.83 MG/DL (ref 0.7–1.3)
ERYTHROCYTE [DISTWIDTH] IN BLOOD BY AUTOMATED COUNT: 13.1 % (ref 11.5–14.5)
GLUCOSE SERPL-MCNC: 119 MG/DL (ref 65–100)
HCT VFR BLD AUTO: 42.9 % (ref 36.6–50.3)
HGB BLD-MCNC: 14.6 G/DL (ref 12.1–17)
MAGNESIUM SERPL-MCNC: 2 MG/DL (ref 1.6–2.4)
MCH RBC QN AUTO: 36.6 PG (ref 26–34)
MCHC RBC AUTO-ENTMCNC: 34 G/DL (ref 30–36.5)
MCV RBC AUTO: 107.5 FL (ref 80–99)
NRBC # BLD: 0 K/UL (ref 0–0.01)
NRBC BLD-RTO: 0 PER 100 WBC
PHOSPHATE SERPL-MCNC: 3.1 MG/DL (ref 2.6–4.7)
PLATELET # BLD AUTO: 300 K/UL (ref 150–400)
PMV BLD AUTO: 8.7 FL (ref 8.9–12.9)
POTASSIUM SERPL-SCNC: 4 MMOL/L (ref 3.5–5.1)
RBC # BLD AUTO: 3.99 M/UL (ref 4.1–5.7)
SODIUM SERPL-SCNC: 133 MMOL/L (ref 136–145)
WBC # BLD AUTO: 18.8 K/UL (ref 4.1–11.1)

## 2022-08-29 PROCEDURE — 84100 ASSAY OF PHOSPHORUS: CPT

## 2022-08-29 PROCEDURE — 36415 COLL VENOUS BLD VENIPUNCTURE: CPT

## 2022-08-29 PROCEDURE — 74011250636 HC RX REV CODE- 250/636: Performed by: SURGERY

## 2022-08-29 PROCEDURE — 65270000029 HC RM PRIVATE

## 2022-08-29 PROCEDURE — 80048 BASIC METABOLIC PNL TOTAL CA: CPT

## 2022-08-29 PROCEDURE — 94760 N-INVAS EAR/PLS OXIMETRY 1: CPT

## 2022-08-29 PROCEDURE — 77010033678 HC OXYGEN DAILY

## 2022-08-29 PROCEDURE — 83735 ASSAY OF MAGNESIUM: CPT

## 2022-08-29 PROCEDURE — 74011000250 HC RX REV CODE- 250: Performed by: SURGERY

## 2022-08-29 PROCEDURE — 74011250637 HC RX REV CODE- 250/637: Performed by: SURGERY

## 2022-08-29 PROCEDURE — 74011000258 HC RX REV CODE- 258: Performed by: SURGERY

## 2022-08-29 PROCEDURE — 85027 COMPLETE CBC AUTOMATED: CPT

## 2022-08-29 RX ORDER — HYDRALAZINE HYDROCHLORIDE 20 MG/ML
10 INJECTION INTRAMUSCULAR; INTRAVENOUS
Status: DISCONTINUED | OUTPATIENT
Start: 2022-08-29 | End: 2022-09-05 | Stop reason: HOSPADM

## 2022-08-29 RX ADMIN — Medication 1 AMPULE: at 22:22

## 2022-08-29 RX ADMIN — POTASSIUM CHLORIDE, DEXTROSE MONOHYDRATE AND SODIUM CHLORIDE 125 ML/HR: 150; 5; 450 INJECTION, SOLUTION INTRAVENOUS at 13:27

## 2022-08-29 RX ADMIN — Medication 1 AMPULE: at 09:38

## 2022-08-29 RX ADMIN — METRONIDAZOLE 500 MG: 500 INJECTION, SOLUTION INTRAVENOUS at 13:28

## 2022-08-29 RX ADMIN — POTASSIUM CHLORIDE, DEXTROSE MONOHYDRATE AND SODIUM CHLORIDE 125 ML/HR: 150; 5; 450 INJECTION, SOLUTION INTRAVENOUS at 22:23

## 2022-08-29 RX ADMIN — POTASSIUM CHLORIDE, DEXTROSE MONOHYDRATE AND SODIUM CHLORIDE 125 ML/HR: 150; 5; 450 INJECTION, SOLUTION INTRAVENOUS at 03:13

## 2022-08-29 RX ADMIN — METRONIDAZOLE 500 MG: 500 INJECTION, SOLUTION INTRAVENOUS at 05:30

## 2022-08-29 RX ADMIN — MAGNESIUM SULFATE HEPTAHYDRATE: 500 INJECTION, SOLUTION INTRAMUSCULAR; INTRAVENOUS at 18:45

## 2022-08-29 RX ADMIN — LEVOFLOXACIN 750 MG: 5 INJECTION, SOLUTION INTRAVENOUS at 13:28

## 2022-08-29 RX ADMIN — METRONIDAZOLE 500 MG: 500 INJECTION, SOLUTION INTRAVENOUS at 22:23

## 2022-08-29 NOTE — PROGRESS NOTES
Bedside shift change report given to Una Chaudhary Rn (oncoming nurse) by KRISTA King (offgoing nurse). Report included the following information SBAR and Kardex.

## 2022-08-29 NOTE — PROGRESS NOTES
SURGERY PROGRESS NOTE      Admit Date: 2022    POD 2 Days Post-Op    Procedure: Procedure(s):  LAPAROTOMY EXPLORATORY, SIGMOID COLECTOMY, APPENDECTOMY      Subjective:     Patient feels better today. Much less pain. Denies nausea. No flatus yet       Objective:     Visit Vitals  BP (!) 142/89   Pulse 87   Temp 98.2 °F (36.8 °C)   Resp 17   Ht 5' 10\" (1.778 m)   Wt 76.3 kg (168 lb 3.4 oz)   SpO2 93%   BMI 24.14 kg/m²        Temp (24hrs), Av.3 °F (36.8 °C), Min:98.1 °F (36.7 °C), Max:98.9 °F (37.2 °C)      No intake/output data recorded.    1901 -  0700  In: 8476.6 [I.V.:8476.6]  Out: 3342 [Urine:1650; Drains:66]    Physical Exam:    General:  alert, cooperative, no distress, appears stated age   Abdomen: soft, bowel sounds active, mildly tender   Incision:   healing well, no drainage, no erythema, no hernia, no seroma, no swelling, no dehiscence, incision well approximated           Lab Results   Component Value Date/Time    WBC 18.8 (H) 2022 03:14 AM    HGB 14.6 2022 03:14 AM    HCT 42.9 2022 03:14 AM    PLATELET 739  03:14 AM    .5 (H) 2022 03:14 AM     Lab Results   Component Value Date/Time    GFR est non-AA >60 2022 03:14 AM    GFR est AA >60 2022 03:14 AM    Creatinine 0.83 2022 03:14 AM    BUN 11 2022 03:14 AM    Sodium 133 (L) 2022 03:14 AM    Potassium 4.0 2022 03:14 AM    Chloride 102 2022 03:14 AM    CO2 26 2022 03:14 AM    Magnesium 2.0 2022 03:14 AM    Phosphorus 3.1 2022 03:14 AM       Assessment:     Principal Problem:    Diverticulitis of colon with perforation (2022)    Recovering as expected     Plan:      D/C jameel  OCRISTOBAL  Keep NG for now

## 2022-08-29 NOTE — PROGRESS NOTES
Transition of Care Plan:     RUR: 7% low risk for readmission   Disposition: Home   Follow up appointments: PCP, Surgeon? DME needed: None anticipated  Transportation at Discharge: Driving self home   [address] or means to access home: Has access  IM Medicare Letter: N/A - Commercial coverage  Is patient a [place] and connected with the [place]? N/A              If yes, was [place] transfer form completed and [facility] notified? Caregiver Contact: Pt's son, Alejandra Terrazas) [phone]  Discharge Caregiver contacted prior to discharge? To be contacted if pt wishes  Care Conference needed?:   No          CM reviewed pt's chart & will continue to follow.     Celestia Lucian  Ext [phone]

## 2022-08-29 NOTE — PROGRESS NOTES
End of Shift Note    Bedside shift change report given to Lisseth Lopez RN  (oncoming nurse) by Carissa Haas LPN (offgoing nurse). Report included the following information \\    Shift worked:  7p-7a     Shift summary and any significant changes:    Pt had several questions on how to use the PCA pump and about his dosages. NG reinforced  back to 65cm. Abd dressing intact. Morning labs complete. Concerns for physician to address: None     Zone phone for oncoming shift:  [phone]       Activity:  Activity Level: Up ad peggy  Number times ambulated in hallways past shift: 0  Number of times OOB to chair past shift: 0    Cardiac:   Cardiac Monitoring: No      Cardiac Rhythm: Sinus Rhythm    Access:  Double Lumen PICC    Genitourinary:   Urinary status: jorgensen    Respiratory:   O2 Device: Nasal cannula  Chronic home O2 use?: YES  Incentive spirometer at bedside: YES       GI:  Last Bowel Movement Date: 08/23/22  Current diet:  DIET NPO Ice Chips  TPN ADULT - CENTRAL  Passing flatus: YES  Tolerating current diet: YES       Pain Management:   Patient states pain is manageable on current regimen: YES    Skin:  Urban Score: 20  Interventions: float heels and increase time out of bed    Patient Safety:  Fall Score:  Total Score: 1  Interventions: bed/chair alarm, assistive device (walker, cane, etc), and pt to call before getting OOB       Length of Stay:  Expected LOS: 2d 14h  Actual LOS: [address], LPN

## 2022-08-30 LAB
ANION GAP SERPL CALC-SCNC: 7 MMOL/L (ref 5–15)
BUN SERPL-MCNC: 13 MG/DL (ref 6–20)
BUN/CREAT SERPL: 17 (ref 12–20)
CALCIUM SERPL-MCNC: 8.2 MG/DL (ref 8.5–10.1)
CHLORIDE SERPL-SCNC: 102 MMOL/L (ref 97–108)
CO2 SERPL-SCNC: 26 MMOL/L (ref 21–32)
CREAT SERPL-MCNC: 0.77 MG/DL (ref 0.7–1.3)
ERYTHROCYTE [DISTWIDTH] IN BLOOD BY AUTOMATED COUNT: 12.9 % (ref 11.5–14.5)
GLUCOSE SERPL-MCNC: 104 MG/DL (ref 65–100)
HCT VFR BLD AUTO: 40 % (ref 36.6–50.3)
HGB BLD-MCNC: 13.9 G/DL (ref 12.1–17)
MAGNESIUM SERPL-MCNC: 2 MG/DL (ref 1.6–2.4)
MCH RBC QN AUTO: 36.9 PG (ref 26–34)
MCHC RBC AUTO-ENTMCNC: 34.8 G/DL (ref 30–36.5)
MCV RBC AUTO: 106.1 FL (ref 80–99)
NRBC # BLD: 0 K/UL (ref 0–0.01)
NRBC BLD-RTO: 0 PER 100 WBC
PHOSPHATE SERPL-MCNC: 3.1 MG/DL (ref 2.6–4.7)
PLATELET # BLD AUTO: 276 K/UL (ref 150–400)
PMV BLD AUTO: 8.9 FL (ref 8.9–12.9)
POTASSIUM SERPL-SCNC: 3.9 MMOL/L (ref 3.5–5.1)
RBC # BLD AUTO: 3.77 M/UL (ref 4.1–5.7)
SODIUM SERPL-SCNC: 135 MMOL/L (ref 136–145)
WBC # BLD AUTO: 18.8 K/UL (ref 4.1–11.1)

## 2022-08-30 PROCEDURE — 84100 ASSAY OF PHOSPHORUS: CPT

## 2022-08-30 PROCEDURE — 74011250636 HC RX REV CODE- 250/636: Performed by: SURGERY

## 2022-08-30 PROCEDURE — 83735 ASSAY OF MAGNESIUM: CPT

## 2022-08-30 PROCEDURE — 74011000250 HC RX REV CODE- 250: Performed by: SURGERY

## 2022-08-30 PROCEDURE — 74011000258 HC RX REV CODE- 258: Performed by: SURGERY

## 2022-08-30 PROCEDURE — 74011250637 HC RX REV CODE- 250/637: Performed by: SURGERY

## 2022-08-30 PROCEDURE — 85027 COMPLETE CBC AUTOMATED: CPT

## 2022-08-30 PROCEDURE — 94760 N-INVAS EAR/PLS OXIMETRY 1: CPT

## 2022-08-30 PROCEDURE — 80048 BASIC METABOLIC PNL TOTAL CA: CPT

## 2022-08-30 PROCEDURE — 36415 COLL VENOUS BLD VENIPUNCTURE: CPT

## 2022-08-30 PROCEDURE — 65270000029 HC RM PRIVATE

## 2022-08-30 RX ADMIN — METRONIDAZOLE 500 MG: 500 INJECTION, SOLUTION INTRAVENOUS at 12:34

## 2022-08-30 RX ADMIN — POTASSIUM CHLORIDE, DEXTROSE MONOHYDRATE AND SODIUM CHLORIDE 125 ML/HR: 150; 5; 450 INJECTION, SOLUTION INTRAVENOUS at 09:15

## 2022-08-30 RX ADMIN — Medication 1 AMPULE: at 22:14

## 2022-08-30 RX ADMIN — LEVOFLOXACIN 750 MG: 5 INJECTION, SOLUTION INTRAVENOUS at 12:34

## 2022-08-30 RX ADMIN — MAGNESIUM SULFATE HEPTAHYDRATE: 500 INJECTION, SOLUTION INTRAMUSCULAR; INTRAVENOUS at 17:50

## 2022-08-30 RX ADMIN — POTASSIUM CHLORIDE, DEXTROSE MONOHYDRATE AND SODIUM CHLORIDE 125 ML/HR: 150; 5; 450 INJECTION, SOLUTION INTRAVENOUS at 17:50

## 2022-08-30 RX ADMIN — METRONIDAZOLE 500 MG: 500 INJECTION, SOLUTION INTRAVENOUS at 06:49

## 2022-08-30 RX ADMIN — Medication 1 AMPULE: at 09:15

## 2022-08-30 RX ADMIN — METRONIDAZOLE 500 MG: 500 INJECTION, SOLUTION INTRAVENOUS at 22:04

## 2022-08-30 NOTE — PROGRESS NOTES
Spiritual Care Assessment/Progress Note  [facility]      NAME: Nona Martínez      MRN: [id number]  AGE: 61 y.o.  SEX: male  Holiness Affiliation: [place]   Language: English     8/30/2022     Total Time (in minutes): 30     Spiritual Assessment begun in [facility] SURG TELE through conversation with:         [x]Patient        [] Family    [] Friend(s)        Reason for Consult: Initial/Spiritual assessment, patient floor     Spiritual beliefs: (Please include comment if needed)     [x] Identifies with a emma tradition:  Yarsanism       [] Supported by a emma community:            [] Claims no spiritual orientation:           [] Seeking spiritual identity:                [] Adheres to an individual form of spirituality:           [] Not able to assess:                           Identified resources for coping:      [x] Prayer                               [] Music                  [] Guided Imagery     [x] Family/friends                 [] Pet visits     [] Devotional reading                         [] Unknown     [] Other:                                                Interventions offered during this visit: (See comments for more details)    Patient Interventions: Affirmation of emotions/emotional suffering, Affirmation of emma, Catharsis/review of pertinent events in supportive environment, Advance medical directive consult, Coping skills reviewed/reinforced, Iconic (affirming the presence of God/Higher Power), Initial/Spiritual assessment, patient floor, Normalization of emotional/spiritual concerns, Prayer (assurance of), Holiness beliefs/image of God discussed           Plan of Care:     [] Support spiritual and/or cultural needs    [] Support AMD and/or advance care planning process      [] Support grieving process   [] Coordinate Rites and/or Rituals    [] Coordination with community clergy   [x] No spiritual needs identified at this time   [] Detailed Plan of Care below (See Comments) [] Make referral to Music Therapy  [] Make referral to Pet Therapy     [] Make referral to Addiction services  [] Make referral to [facility]  [] Make referral to Spiritual Care Partner  [] No future visits requested        [x] Contact Spiritual Care for further referrals     Comments:  ACP Assessment:    Prior to routine visit, review of chart noted only an ex-spouse in contacts, but care manager note mentioned a son. Mentioned to patient that it would be helpful to have an advance medical directive on file or at the very least document the legal next of kin. He indicated his AMD is on file with [facility]; he only came here because it was the nearest emergency room. With his permission, documented his son Beverly Cyr as his legal next of kin/primary decision maker. Initiated spiritual assessment during visit. Patient spoke about recent medical events and how fortuitous it was that the onset was shortly after traveling and only a few days before he would be gone again. He feels blessed to have the prayers and support of family and friends. He expressed no spiritual needs or concerns but was receptive to assurance of prayer.        Shazia Cavazos, HOLDEN, [facility], Staff [address]    [address] Paging Service  [phone]

## 2022-08-30 NOTE — ACP (ADVANCE CARE PLANNING)
Advance Care Planning   Advance Care Planning Inpatient Note  [address]  Spiritual Care Department    Today's Date: 8/30/2022  Unit: [facility] SURG TELE    Review of chart noted an ex-spouse in contacts, but care manager note mentioned a son. Upon review of chart and communication with care team, patient's decision making abilities are not in question. Patient was/were present in the room during visit. Goals of ACP Conversation:  Establish legal next of kin, healthcare decision maker     Health Care Decision Makers:      Primary Decision Maker: Devora Pryor - [phone]    Summary:  Documented Next of Kin, per patient report      Advance Care Planning Documents (Patient Wishes) on file:  None    Assessment:    Prior to routine visit, review of chart noted only an ex-spouse in contacts, but care manager note mentioned a son. Mentioned to patient that it would be helpful to have an advance medical directive on file or at the very least document the legal next of kin. He indicated his AMD is on file with [facility]; he only came here because it was the nearest emergency room. With his permission, documented his son Trudy Flores as his legal next of kin/primary decision maker.      Interventions:  Discussed and provided education on state decision maker hierarchy    Care Preferences Communicated:  No    Outcomes/Plan:  ACP Discussion Refused    Brenda Mejias [facility]  on 8/30/2022 at 2:47 PM

## 2022-08-30 NOTE — PROGRESS NOTES
SURGERY PROGRESS NOTE      Admit Date: 2022    POD 3 Days Post-Op    Procedure: Procedure(s):  LAPAROTOMY EXPLORATORY, SIGMOID COLECTOMY, APPENDECTOMY      Subjective:     Continues to improve. No bowel function yet. Objective:     Visit Vitals  BP (!) 149/90   Pulse 95   Temp 98.1 °F (36.7 °C)   Resp 18   Ht 5' 10\" (1.778 m)   Wt 76.3 kg (168 lb 3.4 oz)   SpO2 94%   BMI 24.14 kg/m²        Temp (24hrs), Av °F (36.7 °C), Min:97.5 °F (36.4 °C), Max:98.1 °F (36.7 °C)      No intake/output data recorded.  1901 -  0700  In: 2667.8 [I.V.:2667.8]  Out: 9698 [Urine:1800; Drains:45]    Physical Exam:    General:  alert, cooperative, no distress, appears stated age   Abdomen: soft, bowel sounds active, mildly tender   Incision:   healing well, no drainage, no erythema, no hernia, no seroma, no swelling, no dehiscence, incision well approximated           Lab Results   Component Value Date/Time    WBC 18.8 (H) 2022 12:49 AM    HGB 13.9 2022 12:49 AM    HCT 40.0 2022 12:49 AM    PLATELET 437  12:49 AM    .1 (H) 2022 12:49 AM     Lab Results   Component Value Date/Time    GFR est non-AA >60 2022 12:49 AM    GFR est AA >60 2022 12:49 AM    Creatinine 0.77 2022 12:49 AM    BUN 13 2022 12:49 AM    Sodium 135 (L) 2022 12:49 AM    Potassium 3.9 2022 12:49 AM    Chloride 102 2022 12:49 AM    CO2 26 2022 12:49 AM    Magnesium 2.0 2022 12:49 AM    Phosphorus 3.1 2022 12:49 AM       Assessment:     Principal Problem:    Diverticulitis of colon with perforation (2022)    Recovering as expected     Plan: Mobilize a lot. Wean PCA  Clamp trial, but keep NGT   D/C planning later this week.      Shila Stockton, NP

## 2022-08-30 NOTE — PROGRESS NOTES
Problem: Falls - Risk of  Goal: *Absence of Falls  Description: Document Toy Blunt Fall Risk and appropriate interventions in the flowsheet.   Outcome: Progressing Towards Goal  Note: Fall Risk Interventions:            Medication Interventions: Teach patient to arise slowly         History of Falls Interventions: Bed/chair exit alarm

## 2022-08-30 NOTE — PROGRESS NOTES
SURGERY PROGRESS NOTE      Admit Date: 2022    POD 3 Days Post-Op    Procedure: Procedure(s):  LAPAROTOMY EXPLORATORY, SIGMOID COLECTOMY, APPENDECTOMY      Subjective:     Patient states he feels much better. Admissions symptoms are resolved and he has more energy. Incisional pain controlled. No flatus yet      Objective:     Visit Vitals  BP (!) 149/90   Pulse 95   Temp 98.1 °F (36.7 °C)   Resp 18   Ht 5' 10\" (1.778 m)   Wt 76.3 kg (168 lb 3.4 oz)   SpO2 94%   BMI 24.14 kg/m²        Temp (24hrs), Av °F (36.7 °C), Min:97.5 °F (36.4 °C), Max:98.1 °F (36.7 °C)      No intake/output data recorded.    190 -  0700  In: 2667.8 [I.V.:2667.8]  Out:  [Urine:1800; Drains:45]    Physical Exam:    General:  alert, cooperative, no distress, appears stated age   Abdomen: soft, distended, tympanic , bowel sounds hypoactive, non-tender   Incision:   healing well, no drainage, no erythema, no hernia, no seroma, no swelling, no dehiscence, incision well approximated           Lab Results   Component Value Date/Time    WBC 18.8 (H) 2022 12:49 AM    HGB 13.9 2022 12:49 AM    HCT 40.0 2022 12:49 AM    PLATELET 638  12:49 AM    .1 (H) 2022 12:49 AM     Lab Results   Component Value Date/Time    GFR est non-AA >60 2022 12:49 AM    GFR est AA >60 2022 12:49 AM    Creatinine 0.77 2022 12:49 AM    BUN 13 2022 12:49 AM    Sodium 135 (L) 2022 12:49 AM    Potassium 3.9 2022 12:49 AM    Chloride 102 2022 12:49 AM    CO2 26 2022 12:49 AM    Magnesium 2.0 2022 12:49 AM    Phosphorus 3.1 2022 12:49 AM       Assessment:     Principal Problem:    Diverticulitis of colon with perforation (2022)    Recovering from surgery as expected     Plan:        D/C basal on PCA   Clamp trial of NG  D/C jorgensen  OOB ambulating

## 2022-08-30 NOTE — PROGRESS NOTES
End of Shift Note    Bedside shift change report given to [address] [place] [address]  (oncoming nurse) by Manuel Moy LPN (offgoing nurse). Report included the following information SBAR, Intake/Output, MAR, Accordion, and Recent Results    Shift worked:  7p-7a     Shift summary and any significant changes:    PCA pump used as needed. Pt voided 600 ml of mainor urine. NG tube is draining 600 ml of green fluids. Dressing changed      Concerns for physician to address:  None      Zone phone for oncoming shift:   [phone]       Activity:  Activity Level: Up with Assistance  Number times ambulated in hallways past shift: 0  Number of times OOB to chair past shift:      Cardiac:   Cardiac Monitoring: No      Cardiac Rhythm: Sinus Rhythm    Access:  Current line(s): PIV     Genitourinary:   Urinary status: voiding    Respiratory:   O2 Device: None (Room air)  Chronic home O2 use?: NO  Incentive spirometer at bedside: N/A       GI:  Last Bowel Movement Date: 08/23/22  Current diet:  DIET NPO Ice Chips  TPN ADULT - CENTRAL  Passing flatus: NO  Tolerating current diet: YES       Pain Management:   Patient states pain is manageable on current regimen: YES    Skin:  Urban Score: 19  Interventions: increase time out of bed    Patient Safety:  Fall Score:  Total Score: 2  Interventions: gripper socks       Length of Stay:  Expected LOS: 5d 16h  Actual LOS: 428 Bill Sylvester LPN

## 2022-08-30 NOTE — PROGRESS NOTES
Comprehensive Nutrition Assessment    Type and Reason for Visit: Reassess    Nutrition Recommendations/Plan:   Continue TPN as ordered  If still requiring TPN by Friday, would decrease TPN rate to 63mL/hr and add lipids (500mL 3x/week) to prevent EFA deficiency  Diet advancement per surgery     Malnutrition Assessment:  Malnutrition Status:  No malnutrition (08/30/22 1613)        Nutrition Assessment:     Chart reviewed. Pt is s/p exlap sigmoid colectomy and appendectomy. Pt remains on TPN, advanced over the weekend and at 83mL/hr today. NGT clamping trial this afternoon, but pt reports discomfort ~30 mins from the end of his trial time. No concerns for malnutrition prior to admission as pt reports stable appetite and weight prior to this admission. We briefly discussed progression of his diet advancement. Will follow up to provide more detailed information and handouts when the time comes/closer to discharge. Pt eager for a cup of coffee, should be allowed once he can have clear liquids. Current TPN: AA5% D20% at 83mL/hr providing 1753 kcals, 100g protin, 398g dextrose per day. Meeting 86% kcals, 100% protein. Wt Readings from Last 5 Encounters:   08/24/22 76.3 kg (168 lb 3.4 oz)   ]    Nutrition Related Findings:    Labs: N 135. Meds: levaquin, flagyl, D5 1/2NS, dilaudid PCA. BM 8/25. Wound Type: Surgical incision (abdomen)    Current Nutrition Intake & Therapies:  Average Meal Intake: NPO       Anthropometric Measures:  Height: 5' 10\" (177.8 cm)  Ideal Body Weight (IBW): 166 lbs (75 kg)     Current Body Wt:  76.3 kg (168 lb 3.4 oz), 101.3 % IBW. Standing scale  Current BMI (kg/m2): 24.1                          BMI Category: Normal weight (BMI 18.5-24. 9)    Estimated Daily Nutrient Needs:  Energy Requirements Based On: Formula  Weight Used for Energy Requirements: Current  Energy (kcal/day): MSJ 2050 (1590 x 1.3)  Weight Used for Protein Requirements: Current  Protein (g/day): 76g (1gPro/kg)  Method Used for Fluid Requirements: 1 ml/kcal  Fluid (ml/day): 2050mL    Nutrition Diagnosis:   Altered GI function related to altered GI function, altered GI structure as evidenced by NPO or clear liquid status due to medical condition  Dx continues. Nutrition Interventions:   Food and/or Nutrient Delivery: Continue parenteral nutrition  Nutrition Education/Counseling: No recommendations at this time  Coordination of Nutrition Care: Continue to monitor while inpatient       Goals:  Previous Goal Met: Goal(s) achieved  Goals: Tolerate nutrition support at goal rate, by next RD assessment, Initiate PO diet       Nutrition Monitoring and Evaluation:   Behavioral-Environmental Outcomes: None identified  Food/Nutrient Intake Outcomes: Parenteral nutrition intake/tolerance  Physical Signs/Symptoms Outcomes: Biochemical data, Nutrition focused physical findings, Skin, Weight, GI status    Discharge Planning:     Too soon to determine    Elda Villareal, [address]  Contact: [phone]

## 2022-08-31 LAB
ANION GAP SERPL CALC-SCNC: 5 MMOL/L (ref 5–15)
BUN SERPL-MCNC: 12 MG/DL (ref 6–20)
BUN/CREAT SERPL: 16 (ref 12–20)
CALCIUM SERPL-MCNC: 8.7 MG/DL (ref 8.5–10.1)
CHLORIDE SERPL-SCNC: 96 MMOL/L (ref 97–108)
CO2 SERPL-SCNC: 30 MMOL/L (ref 21–32)
CREAT SERPL-MCNC: 0.77 MG/DL (ref 0.7–1.3)
GLUCOSE SERPL-MCNC: 116 MG/DL (ref 65–100)
MAGNESIUM SERPL-MCNC: 2.1 MG/DL (ref 1.6–2.4)
PHOSPHATE SERPL-MCNC: 3 MG/DL (ref 2.6–4.7)
POTASSIUM SERPL-SCNC: 3.8 MMOL/L (ref 3.5–5.1)
SODIUM SERPL-SCNC: 131 MMOL/L (ref 136–145)

## 2022-08-31 PROCEDURE — 65270000029 HC RM PRIVATE

## 2022-08-31 PROCEDURE — 74011250636 HC RX REV CODE- 250/636: Performed by: NURSE PRACTITIONER

## 2022-08-31 PROCEDURE — 80048 BASIC METABOLIC PNL TOTAL CA: CPT

## 2022-08-31 PROCEDURE — 94760 N-INVAS EAR/PLS OXIMETRY 1: CPT

## 2022-08-31 PROCEDURE — 74011000250 HC RX REV CODE- 250: Performed by: NURSE PRACTITIONER

## 2022-08-31 PROCEDURE — 77030019563 HC DEV ATTCH FEED HOLL -A

## 2022-08-31 PROCEDURE — 74011250637 HC RX REV CODE- 250/637: Performed by: SURGERY

## 2022-08-31 PROCEDURE — 84100 ASSAY OF PHOSPHORUS: CPT

## 2022-08-31 PROCEDURE — 74011250636 HC RX REV CODE- 250/636: Performed by: SURGERY

## 2022-08-31 PROCEDURE — 83735 ASSAY OF MAGNESIUM: CPT

## 2022-08-31 PROCEDURE — 74011000258 HC RX REV CODE- 258: Performed by: NURSE PRACTITIONER

## 2022-08-31 PROCEDURE — 36415 COLL VENOUS BLD VENIPUNCTURE: CPT

## 2022-08-31 RX ORDER — METRONIDAZOLE 500 MG/100ML
500 INJECTION, SOLUTION INTRAVENOUS EVERY 12 HOURS
Status: DISCONTINUED | OUTPATIENT
Start: 2022-08-31 | End: 2022-09-05 | Stop reason: HOSPADM

## 2022-08-31 RX ADMIN — Medication 1 AMPULE: at 20:11

## 2022-08-31 RX ADMIN — METRONIDAZOLE 500 MG: 500 INJECTION, SOLUTION INTRAVENOUS at 06:26

## 2022-08-31 RX ADMIN — Medication: at 03:18

## 2022-08-31 RX ADMIN — MAGNESIUM SULFATE HEPTAHYDRATE: 500 INJECTION, SOLUTION INTRAMUSCULAR; INTRAVENOUS at 18:40

## 2022-08-31 RX ADMIN — POTASSIUM CHLORIDE, DEXTROSE MONOHYDRATE AND SODIUM CHLORIDE 125 ML/HR: 150; 5; 450 INJECTION, SOLUTION INTRAVENOUS at 23:10

## 2022-08-31 RX ADMIN — POTASSIUM CHLORIDE, DEXTROSE MONOHYDRATE AND SODIUM CHLORIDE 125 ML/HR: 150; 5; 450 INJECTION, SOLUTION INTRAVENOUS at 12:42

## 2022-08-31 RX ADMIN — METRONIDAZOLE 500 MG: 500 INJECTION, SOLUTION INTRAVENOUS at 18:51

## 2022-08-31 RX ADMIN — POTASSIUM CHLORIDE, DEXTROSE MONOHYDRATE AND SODIUM CHLORIDE 125 ML/HR: 150; 5; 450 INJECTION, SOLUTION INTRAVENOUS at 03:54

## 2022-08-31 RX ADMIN — LEVOFLOXACIN 750 MG: 5 INJECTION, SOLUTION INTRAVENOUS at 12:42

## 2022-08-31 RX ADMIN — Medication 1 AMPULE: at 08:41

## 2022-08-31 NOTE — PROGRESS NOTES
End of Shift Note    Bedside shift change report given to Denae Slaughter RN (oncoming nurse) by Argenis Johnson RN (offgoing nurse). Report included the following information SBAR, Kardex, Intake/Output, and MAR    Shift worked:  7p-7a     Shift summary and any significant changes:     Abdominal dressing reinforced this shift. Patient utilized PCA pump to manage pain this shift. Patient's NG tube was re-connected   to suction this shift after being clamped for 4 hours; output was greater than 150-- see MAR. New adhesive bridle applied this shift       Concerns for physician to address:  Wound care orders? Zone phone for oncoming shift:          Activity:  Activity Level: Up with Assistance  Number times ambulated in hallways past shift: 0  Number of times OOB to chair past shift: 0    Cardiac:   Cardiac Monitoring: No      Cardiac Rhythm: Sinus Rhythm    Access:  Current line(s): PIV     Genitourinary:   Urinary status: voiding    Respiratory:   O2 Device: None (Room air)    GI:  Last Bowel Movement Date: 08/25/22  Current diet:  DIET NPO Ice Chips  TPN ADULT - CENTRAL  Tolerating current diet: YES       Pain Management:   Patient states pain is manageable on current regimen: YES    Skin:  Urban Score: 21  Interventions: speciality bed, increase time out of bed, and foam dressing    Patient Safety:  Fall Score:  Total Score: 1  Interventions: assistive device (walker, cane, etc), gripper socks, and pt to call before getting OOB       Length of Stay:  Expected LOS: 5d 16h  Actual LOS: 7      Argenis Johnson RN

## 2022-08-31 NOTE — PROGRESS NOTES
SURGERY PROGRESS NOTE      Admit Date: 2022    POD 4 Days Post-Op    Procedure: Procedure(s):  LAPAROTOMY EXPLORATORY, SIGMOID COLECTOMY, APPENDECTOMY      Subjective:     Patient feels about the same. Still no bowel function. Belching and reflux. Objective:     Visit Vitals  BP (!) 144/84 (BP 1 Location: Left upper arm, BP Patient Position: At rest)   Pulse 79   Temp 98.6 °F (37 °C)   Resp 18   Ht 5' 10\" (1.778 m)   Wt 76.3 kg (168 lb 3.4 oz)   SpO2 95%   BMI 24.14 kg/m²        Temp (24hrs), Av.4 °F (36.9 °C), Min:98.1 °F (36.7 °C), Max:98.6 °F (37 °C)      No intake/output data recorded.  1901 -  0700  In: 2667.8 [I.V.:2667.8]  Out: 12 [Urine:2655; Drains:55]    Physical Exam:    General:  alert, cooperative, no distress, appears stated age   Abdomen: distended, bowel sounds active, mildly tender   Incision:   healing well, no drainage, no erythema, no hernia, no seroma, no swelling, no dehiscence, incision well approximated           Lab Results   Component Value Date/Time    WBC 18.8 (H) 2022 12:49 AM    HGB 13.9 2022 12:49 AM    HCT 40.0 2022 12:49 AM    PLATELET 125  12:49 AM    .1 (H) 2022 12:49 AM     Lab Results   Component Value Date/Time    GFR est non-AA >60 2022 12:49 AM    GFR est AA >60 2022 12:49 AM    Creatinine 0.77 2022 12:49 AM    BUN 13 2022 12:49 AM    Sodium 135 (L) 2022 12:49 AM    Potassium 3.9 2022 12:49 AM    Chloride 102 2022 12:49 AM    CO2 26 2022 12:49 AM    Magnesium 2.0 2022 12:49 AM    Phosphorus 3.1 2022 12:49 AM       Assessment:     Principal Problem:    Diverticulitis of colon with perforation (2022)    Recovering as expected- slow progress due to obstruction prior to surgery    Plan: Mobilize a lot. Continue PCA until able to take PO  Clamp trial again today. D/C planning when having return of bowel function.    Continue TPN- nutrition recs to reduce rate if still needing on Friday.       Madeleine Marie, NP

## 2022-08-31 NOTE — ROUTINE PROCESS
End of Shift Note    Bedside shift change report given to Allegra Maldonado (oncoming nurse) by Lisa Moody RN (offgoing nurse). Report included the following information SBAR, Kardex, Procedure Summary, and Intake/Output    Shift worked:  7 a.m.-7 p.m. Shift summary and any significant changes:     Pain controlled, increased ambulation, clamp trials for NG tube, PCA dosing      Concerns for physician to address:  None      Zone phone for oncoming shift:   [phone]       Activity:  Activity Level: Up ad peggy  Number times ambulated in hallways past shift: 3  Number of times OOB to chair past shift: > 5    Cardiac:   Cardiac Monitoring: No      Cardiac Rhythm: Sinus Rhythm    Access:  Current line(s): PICC     Genitourinary:   Urinary status: voiding    Respiratory:   O2 Device: None (Room air)  Chronic home O2 use?: NO  Incentive spirometer at bedside: YES       GI:  Last Bowel Movement Date: 08/25/22  Current diet:  DIET NPO Ice Chips  TPN ADULT - CENTRAL  Passing flatus: YES  Tolerating current diet: NO       Pain Management:   Patient states pain is manageable on current regimen: YES    Skin:  Urban Score: 21  Interventions: increase time out of bed    Patient Safety:  Fall Score:  Total Score: 1  Interventions: gripper socks       Length of Stay:  Expected LOS: 5d 16h  Actual LOS: [address] Butler RN

## 2022-08-31 NOTE — OP NOTES
[address]  OPERATIVE REPORT    Name:  Carina Jenkins  MR#:  [id number]  :  1965  ACCOUNT #:  [de-identified]  DATE OF SERVICE:  2022    PREOPERATIVE DIAGNOSES:  1. Perforated diverticulitis of the sigmoid colon. 2.  Pneumoperitoneum. 3.  Small-bowel obstruction. POSTOPERATIVE DIAGNOSES:  1. Perforated diverticulitis of the sigmoid colon. 2.  Pneumoperitoneum. 3.  Small-bowel obstruction. PROCEDURE PERFORMED:  Sigmoid colectomy with primary anastomosis and incidental appendectomy. SURGEON:  Jerica Holley MD    ASSISTANT:  Refugio Osgood. ANESTHESIA:  General endotracheal.    COMPLICATIONS:  None. SPECIMENS REMOVED:  1. Sigmoid colon. 2.  Proximal donut. 3.  Distal donut. 4.  Appendix. IMPLANTS:  None. ESTIMATED BLOOD LOSS:  Less than 100 mL. DRAINS:  1.  W75-Tjxfzo round drain in the pelvis. 2.  A blue vessel loop as a virtual ileostomy. FINDINGS:  1. Dilated fluid-filled loops of small bowel. 2.  Transition in the pelvis due to inflammatory adhesions. 3.  No evidence of small bowel perforation. 4.  No gross contamination of the abdomen. 5.  Severe inflammation of the sigmoid colon and its mesentery. No clear site of perforation is visually identified. 5.  A 29-mm EEA colorectal anastomosis. 6.  Intact healthy appearing donuts. 7.  Proctoscopy, the anastomosis appeared patent with no signs of any bleeding. On insufflation through the proctoscope, there was a few bubbles seen in the irrigation solution. The area was oversewn with three interrupted 3-0 silk Lembert sutures repeat proctoscopy with high-pressure insufflation showed no bubbling. INDICATIONS FOR PROCEDURE:  The patient is a 48-year-old man with a known history of acute diverticulitis. The patient was admitted to the hospital with acute diverticulitis causing a small bowel obstruction.   He was managed conservatively with IV antibiotics and NG decompression without much improvement. A repeat abdomen CT was done to assess for an interval change and this was read as worsening inflammation of the small bowel at the site of the small bowel obstruction with stable contained perforation of the sigmoid colon. Six hours later, the patient had a plain film to assess progression of the oral contrast to see if it traverse the area of small bowel obstruction and this plane film was concerning for new free air. This was confirmed with a stat abdomen and pelvis CT that the patient had developed free air and the six-hour interval, so the patient was taken to the operating room for exploration. DESCRIPTION OF PROCEDURE:  The patient was identified in the preoperative holding area, informed consent obtained, he was given preoperative antibiotics. He was then taken to the operating room, placed in supine position, underwent general endotracheal anesthesia without complication. A Scales catheter was then inserted under sterile technique. His abdomen was prepped and draped in usual sterile fashion. A time-out was performed to confirm the patient by name and that he was presenting for exploration with sigmoid colectomy with possible primary anastomosis and possible small bowel resection. Once this was confirmed, a midline incision was made starting 4 cm below his xiphoid and extending to his pubic symphysis. A scalpel was used to cut through the skin. The subcutaneous tissue was divided with electrocautery. The sutures were visible in the linea alba. These were removed by holding the suture with a Kocher forceps and cutting the suture with Metzenbaum scissors. Once the suture material was removed, the linea alba was divided with electrocautery. The patient was found to have a subfascial mesh that was placed along the midline. This was in the epigastric region and extended to approximately 2 cm below the umbilicus.   Therefore, the linea alba below the mesh was divided with electrocautery. The peritoneum in this location was elevated between two DeBakey forceps and the peritoneum opened with Metzenbaum scissors. A surgeon's finger was then inserted into the abdominal cavity and the mesh divided over the surgeon's finger to protect underlying viscera. There were no adhesions in the area of the umbilicus, so the mesh was opened easily with electrocautery. This was continued cephalad. Omental adhesions were identified, so the edges of the mesh were held with Kocher forceps and Metzenbaum scissors were used to lyse adhesions. This was continued up to the most superior aspect of the incision. Once this was completed, a wound protector was inserted after the abdominal wall was checked for any additional adhesions. There were no additional adhesions to the abdominal wall identified, so the wound protector was inserted. The patient's small bowel was noted to be markedly distended up to 8 cm in diameter. Loops of small bowel were eviscerated out through the wound protector though ligament of Treitz was identified. The stomach was palpated to ensure good position of the NG tube. Enteric contents were then milked back to the stomach. A total of 2 liters was removed through the nasogastric tube as the small bowel was decompressed. Once the majority of small bowel was decompressed, it was placed in the right upper quadrant and attention focused in the pelvis. The cecum was identified. The terminal ileum was completely decompressed. The terminal ileum was followed proximally down into the deep pelvis where inflammatory adhesions between the ileum and the anterior surface of the pelvis as well as the sigmoid colon was identified. These inflammatory adhesions were divided with a combination of blunt and sharp dissection to free the loops of small bowel. Once these loops were free, they were brought out through the midline incision and thoroughly examined.   There were no signs of any serosal injuries or perforations. The small bowel was then run from the ileocecal valve to the ligament of Treitz and the serosa appeared normal except for the segment of inflamed bowel that was adherent to the sigmoid colon. This was thickened and inflamed, but showed no signs of any perforation. Attention was then focused on the left colon. The white line of Toldt was divided with electrocautery. The patient had a fair amount of redundant sigmoid. This was mobilized on its mesentery. A site was chosen to divide the rectum distal to the area of inflammation. A window was made in the mesorectum and the rectum divided with a blue load on an [place] 16 mm stapler. The sigmoid colon mesentery was then divided with  the LigaSure Impact taking care to place the Impact close to the lumen of the colon. This was continued approximately 5 cm proximal to the area of inflammation at this location. An enterotomy was made in the anterior surface of the descending colon-sigmoid junction and EEA sizer was inserted. It appeared that the patient could accommodate a 29-mm stapler. Therefore, the anvil of the 29-mm stapler with a spike in its stem was advanced 4 cm proximal to this enterotomy and then the spike brought out on the anterior surface of the descending colon through the tinea placing the anvil within the descending colon. The anvil was then sutured by placing a 3-0 pursestring around the stem. The spike was then removed. Approximately 2 cm distal to this anvil placement, the bowel was divided with another fire of the KANG stapler. The specimen was then handed off the back table. The anvil was then brought down to the rectal stump and appeared to overlap the rectal stump easily without any tension. Therefore, I then went between the patient's legs and use EEA sizers to sound out the rectal stump.   On passing any sizer, the sidewall of the rectal stump near the staple line was very thin and transparent, so it was decided to amputate the distal 2 cm of the rectal stump. This was done with a contour stapler. Once this segment was removed, the rectum was then sounded out again with a 29-mm EEA stapler. This time, the rectum appeared healthy, it did not appear thin. The sizer could not be seen through the wall of the rectum. Once this was completed, the handle of the 29-mm EEA stapler was inserted in the patient's rectum and the spike positioned in the middle of the staple line. The spike was then deployed through the staple line until the orange joint could be visualized. Once this was completed, the assistant docked the anvil to the handle. The assistant held the descending colon position to ensure the mesentery was not twisted or kinked, and the staple line at the end of the descending colon was retracted to the patient's right-hand side. The EEA stapler was then closed and fired creating the anastomosis. The EEA stapler was then removed from the patient's rectum. The donuts were inspected. There were found to be beefy and intact. They were sent separately to pathology. The pelvis was then filled with irrigation solution to completely submerge the anastomosis. The surgical assistant compressed the descending colon proximal to the anastomosis. I then inserted a rigid proctoscope. The anastomosis itself appeared viable and patent on the mucosal surface. There were no signs of any bleeding. Once it was visually inspected, the anastomosis was then insufflated by hand using a bulb. Upon compression of the bulb, there was a bubble or two visualized in the irrigation solution. Therefore, I scrubbed and returned to the patient's bedside at the site of the bubbling. Three 3-0 silk Lembert type sutures were placed on the anterior surface of anastomosis. I then repeated the leak test by submerging the anastomosis and performing proctoscopy.   This time, there was no bubbling identified even with very aggressive insufflation indicating a good seal of the anastomosis. The proctoscope was then removed. Given the degree of inflammation in the pelvis, it was decided that the patient would benefit from an incidental appendectomy. Therefore, the mesoappendix was divided with the LigaSure Impact and the appendix amputated from the cecum using a blue load on the Kohler stapler. A site was chosen approximately 20 cm upstream of the ileocecal valve. At this site, a vessel loop was passed through the mesentery using a tonsil forceps. This was to create a virtual ileostomy. In case the patient were to develop an anastomotic leak, a loop ileostomy could be created in the patient's right lower quadrant without reopening the midline incision. Once the vessel loop encircled with small bowel, attention was focused just  superior to this loop and two 2-0 silk sutures were placed in the antimesenteric surface of the bowel to identify the proximal or upstream, and the end that would be matured into a Janett type ostomy. Once these sutures were placed for identification, a small stab incision was made in the lateral aspect of the patient's rectus muscle just below his umbilicus on the right-hand side. The vessel loop was brought out through this location and sutured to the skin surface with 2-0 nylon. Care was taken to ensure the vessel loop, it was not pulled too tightly that it compressed the small bowel. The abdomen was then irrigated with several liters of normal saline and then 500 mL of Irrisept. The Irrisept was allowed to dwell for 5 minutes and then was aspirated and then the abdomen diluted with another liter of normal saline that was aspirated. A 19-[place] Micheal drain was placed through a stab incision on the left abdominal wall and placed down in the deep pelvis. The omentum was placed over the small bowel. Next, the mesh was reapproximated with simple interrupted #1 Prolene sutures.   Once the mesh was reapproximated, the fascia was reapproximated with a running #1 Stratafix symmetric closing the fascia over the mesh. The subcutaneous tissue was then irrigated with Irrisept and then the skin closed with staples, a dry dressing was then applied. The patient was extubated in the room and taken to postanesthesia care unit in stable condition. Instrument and sponge counts were correct x2.         Terrie Hendricks MD      [id number]  D:  08/31/2022 11:15  T:  08/31/2022 14:41  JOB #:  [id number]

## 2022-09-01 ENCOUNTER — APPOINTMENT (OUTPATIENT)
Dept: CT IMAGING | Age: 57
DRG: 330 | End: 2022-09-01
Attending: NURSE PRACTITIONER
Payer: COMMERCIAL

## 2022-09-01 LAB
ANION GAP SERPL CALC-SCNC: 6 MMOL/L (ref 5–15)
BASOPHILS # BLD: 0 K/UL (ref 0–0.1)
BASOPHILS NFR BLD: 0 % (ref 0–1)
BUN SERPL-MCNC: 12 MG/DL (ref 6–20)
BUN/CREAT SERPL: 17 (ref 12–20)
CALCIUM SERPL-MCNC: 8.5 MG/DL (ref 8.5–10.1)
CHLORIDE SERPL-SCNC: 98 MMOL/L (ref 97–108)
CO2 SERPL-SCNC: 28 MMOL/L (ref 21–32)
COMMENT, HOLDF: NORMAL
CREAT SERPL-MCNC: 0.69 MG/DL (ref 0.7–1.3)
DIFFERENTIAL METHOD BLD: ABNORMAL
EOSINOPHIL # BLD: 0.2 K/UL (ref 0–0.4)
EOSINOPHIL NFR BLD: 1 % (ref 0–7)
ERYTHROCYTE [DISTWIDTH] IN BLOOD BY AUTOMATED COUNT: 12.7 % (ref 11.5–14.5)
GLUCOSE SERPL-MCNC: 100 MG/DL (ref 65–100)
HCT VFR BLD AUTO: 39.4 % (ref 36.6–50.3)
HGB BLD-MCNC: 13.6 G/DL (ref 12.1–17)
IMM GRANULOCYTES # BLD AUTO: 0.1 K/UL (ref 0–0.04)
IMM GRANULOCYTES NFR BLD AUTO: 1 % (ref 0–0.5)
LYMPHOCYTES # BLD: 1.8 K/UL (ref 0.8–3.5)
LYMPHOCYTES NFR BLD: 15 % (ref 12–49)
MAGNESIUM SERPL-MCNC: 2.1 MG/DL (ref 1.6–2.4)
MCH RBC QN AUTO: 35.9 PG (ref 26–34)
MCHC RBC AUTO-ENTMCNC: 34.5 G/DL (ref 30–36.5)
MCV RBC AUTO: 104 FL (ref 80–99)
MONOCYTES # BLD: 1.3 K/UL (ref 0–1)
MONOCYTES NFR BLD: 11 % (ref 5–13)
NEUTS SEG # BLD: 8.9 K/UL (ref 1.8–8)
NEUTS SEG NFR BLD: 72 % (ref 32–75)
NRBC # BLD: 0 K/UL (ref 0–0.01)
NRBC BLD-RTO: 0 PER 100 WBC
PHOSPHATE SERPL-MCNC: 3.7 MG/DL (ref 2.6–4.7)
PLATELET # BLD AUTO: 394 K/UL (ref 150–400)
PMV BLD AUTO: 8.8 FL (ref 8.9–12.9)
POTASSIUM SERPL-SCNC: 3.9 MMOL/L (ref 3.5–5.1)
RBC # BLD AUTO: 3.79 M/UL (ref 4.1–5.7)
SAMPLES BEING HELD,HOLD: NORMAL
SODIUM SERPL-SCNC: 132 MMOL/L (ref 136–145)
WBC # BLD AUTO: 12.3 K/UL (ref 4.1–11.1)

## 2022-09-01 PROCEDURE — 74011250637 HC RX REV CODE- 250/637: Performed by: SURGERY

## 2022-09-01 PROCEDURE — 36415 COLL VENOUS BLD VENIPUNCTURE: CPT

## 2022-09-01 PROCEDURE — 74011000636 HC RX REV CODE- 636: Performed by: SURGERY

## 2022-09-01 PROCEDURE — 83735 ASSAY OF MAGNESIUM: CPT

## 2022-09-01 PROCEDURE — 74011250636 HC RX REV CODE- 250/636: Performed by: SURGERY

## 2022-09-01 PROCEDURE — 74177 CT ABD & PELVIS W/CONTRAST: CPT

## 2022-09-01 PROCEDURE — 74011000250 HC RX REV CODE- 250: Performed by: NURSE PRACTITIONER

## 2022-09-01 PROCEDURE — 80048 BASIC METABOLIC PNL TOTAL CA: CPT

## 2022-09-01 PROCEDURE — 84100 ASSAY OF PHOSPHORUS: CPT

## 2022-09-01 PROCEDURE — 74011000636 HC RX REV CODE- 636

## 2022-09-01 PROCEDURE — 65270000029 HC RM PRIVATE

## 2022-09-01 PROCEDURE — 74011000258 HC RX REV CODE- 258: Performed by: NURSE PRACTITIONER

## 2022-09-01 PROCEDURE — 74011250636 HC RX REV CODE- 250/636: Performed by: NURSE PRACTITIONER

## 2022-09-01 PROCEDURE — 85025 COMPLETE CBC W/AUTO DIFF WBC: CPT

## 2022-09-01 RX ADMIN — METRONIDAZOLE 500 MG: 500 INJECTION, SOLUTION INTRAVENOUS at 18:29

## 2022-09-01 RX ADMIN — Medication 1 AMPULE: at 22:14

## 2022-09-01 RX ADMIN — POTASSIUM CHLORIDE, DEXTROSE MONOHYDRATE AND SODIUM CHLORIDE 75 ML/HR: 150; 5; 450 INJECTION, SOLUTION INTRAVENOUS at 09:59

## 2022-09-01 RX ADMIN — Medication 1 AMPULE: at 08:02

## 2022-09-01 RX ADMIN — MAGNESIUM SULFATE HEPTAHYDRATE: 500 INJECTION, SOLUTION INTRAMUSCULAR; INTRAVENOUS at 18:26

## 2022-09-01 RX ADMIN — METRONIDAZOLE 500 MG: 500 INJECTION, SOLUTION INTRAVENOUS at 06:14

## 2022-09-01 RX ADMIN — LEVOFLOXACIN 750 MG: 5 INJECTION, SOLUTION INTRAVENOUS at 16:44

## 2022-09-01 RX ADMIN — IOPAMIDOL 100 ML: 755 INJECTION, SOLUTION INTRAVENOUS at 11:09

## 2022-09-01 RX ADMIN — DIATRIZOATE MEGLUMINE AND DIATRIZOATE SODIUM 30 ML: 660; 100 LIQUID ORAL; RECTAL at 09:03

## 2022-09-01 NOTE — PROGRESS NOTES
SURGERY PROGRESS NOTE      Admit Date: 2022    POD 5 Days Post-Op    Procedure: Procedure(s):  LAPAROTOMY EXPLORATORY, SIGMOID COLECTOMY, APPENDECTOMY      Subjective:     Patient starting to feel cramping gas pain consistent with resuming bowel function. No nausea/vomiting. Objective:     Visit Vitals  /86   Pulse 84   Temp 98.1 °F (36.7 °C)   Resp 18   Ht 5' 10\" (1.778 m)   Wt 76.3 kg (168 lb 3.4 oz)   SpO2 94%   BMI 24.14 kg/m²        Temp (24hrs), Av.1 °F (36.7 °C), Min:97.4 °F (36.3 °C), Max:98.9 °F (37.2 °C)      701 - 1900  In: -   Out: 300   1901 - 700  In: 7097.9 [I.V.:7097.9]  Out:  [Urine:2995; Drains:30]    Physical Exam:    General:  alert, cooperative, no distress, appears stated age   Abdomen: distended, bowel sounds active, mildly tender   Incision:   healing well, no drainage, no erythema, no hernia, no seroma, no swelling, no dehiscence, incision well approximated           Lab Results   Component Value Date/Time    WBC 18.8 (H) 2022 12:49 AM    HGB 13.9 2022 12:49 AM    HCT 40.0 2022 12:49 AM    PLATELET 068 10/11/8125 12:49 AM    .1 (H) 2022 12:49 AM     Lab Results   Component Value Date/Time    GFR est non-AA >60 2022 12:30 AM    GFR est AA >60 2022 12:30 AM    Creatinine 0.69 (L) 2022 12:30 AM    BUN 12 2022 12:30 AM    Sodium 132 (L) 2022 12:30 AM    Potassium 3.9 2022 12:30 AM    Chloride 98 2022 12:30 AM    CO2 28 2022 12:30 AM    Magnesium 2.1 2022 12:30 AM    Phosphorus 3.7 2022 12:30 AM       Assessment:     Principal Problem:    Diverticulitis of colon with perforation (2022)    Recovering as expected- slow progress due to obstruction prior to surgery    Plan: Mobilize a lot. Continue PCA until able to take PO  CBC and CT today  D/C planning when having return of bowel function.    Continue TPN- reduce rate today and add lipids starting tomorrow. Juli Collazo, NP     NOTE FROM DR Darleen LIND  I Rounded with Nurse Practitioner and independently assessed patient with hx and exam and  review appropriate imaging and labs as needed. I  Agree with plan, and review of systems and independent exam as performed by me and nurse practitioner, as outlined above by NP. Patient's white blood cell count down to 12,000 markedly improved, continue current IV antibiotics, will obtain CT scan abdomen and pelvis to make sure no anastomotic leak or other source of obstruction prior to removing virtual ileostomy Vesseloops. Patient concurs with above plan.   Face-to-face time and review of imaging and labs:  20  minutes

## 2022-09-01 NOTE — PROGRESS NOTES
End of Shift Note    Bedside shift change report given to [facility] (oncoming nurse) by Brian Awad LPN (offgoing nurse). Report included the following information SBAR, Intake/Output, MAR, Accordion, and Recent Results    Shift worked: 7p-7a     Shift summary and any significant changes:    No complaints of pain. Pt ambulated in hallway x1. NG is draining 500 ml of green fluids then later in shift Ng draining 750ml of brown fluids. Pt voiding 940 ml of yellow urine. Deandre is draining 10 ml of serous drainage      Concerns for physician to address:  None      Zone phone for oncoming shift:   [phone]       Activity:  Activity Level: Up ad peggy  Number times ambulated in hallways past shift: 1  Number of times OOB to chair past shift: 0    Cardiac:   Cardiac Monitoring: No      Cardiac Rhythm: Sinus Rhythm    Access:  Current line(s): PICC     Genitourinary:   Urinary status: voiding    Respiratory:   O2 Device: None (Room air)  Chronic home O2 use?: NO  Incentive spirometer at bedside:         GI:  Last Bowel Movement Date: 08/25/22  Current diet:  DIET NPO Ice Chips  TPN ADULT - CENTRAL  Passing flatus: NO  Tolerating current diet: YES       Pain Management:   Patient states pain is manageable on current regimen: YES    Skin:  Urban Score: 20  Interventions: increase time out of bed    Patient Safety:  Fall Score:  Total Score: 1  Interventions: gripper socks       Length of Stay:  Expected LOS: 5d 16h  Actual LOS: Matt Fried LPN

## 2022-09-01 NOTE — PROGRESS NOTES
End of Shift Note    Bedside shift change report given to Millie Silva (oncoming nurse) by Edd Pratt RN (offgoing nurse). Report included the following information SBAR, Kardex, Intake/Output, MAR, Recent Results, and Med Rec Status    Shift worked:  Day     Shift summary and any significant changes:     CT scan done. Patient walked several times in the hallway. NG tube clamped at 3:20pm, due to be unclamped at 7:20pm.      Concerns for physician to address:  None     Zone phone for oncoming shift:   [phone]       Activity:  Activity Level: Up ad peggy  Number times ambulated in hallways past shift: 5  Number of times OOB to chair past shift: 0    Cardiac:   Cardiac Monitoring: No      Cardiac Rhythm: Sinus Rhythm    Access:  Current line(s): PICC     Genitourinary:   Urinary status: voiding    Respiratory:   O2 Device: None (Room air)  Chronic home O2 use?: NO  Incentive spirometer at bedside: N/A       GI:  Last Bowel Movement Date: 08/25/22  Current diet:  DIET NPO Ice Chips  TPN ADULT - CENTRAL  TPN ADULT - CENTRAL  Passing flatus: NO  Tolerating current diet: YES       Pain Management:   Patient states pain is manageable on current regimen: YES    Skin:  Urban Score: 20  Interventions: increase time out of bed    Patient Safety:  Fall Score:  Total Score: 1  Interventions: gripper socks       Length of Stay:  Expected LOS: 5d 16h  Actual LOS: [address] Cifuentes, RN

## 2022-09-01 NOTE — PROGRESS NOTES
Problem: Falls - Risk of  Goal: *Absence of Falls  Description: Document Qamar Mendoza Fall Risk and appropriate interventions in the flowsheet.   Outcome: Progressing Towards Goal  Note: Fall Risk Interventions:            Medication Interventions: Teach patient to arise slowly    Elimination Interventions: Call light in reach    History of Falls Interventions: Room close to nurse's station

## 2022-09-02 ENCOUNTER — APPOINTMENT (OUTPATIENT)
Dept: GENERAL RADIOLOGY | Age: 57
DRG: 330 | End: 2022-09-02
Attending: NURSE PRACTITIONER
Payer: COMMERCIAL

## 2022-09-02 LAB
ANION GAP SERPL CALC-SCNC: 7 MMOL/L (ref 5–15)
BUN SERPL-MCNC: 16 MG/DL (ref 6–20)
BUN/CREAT SERPL: 19 (ref 12–20)
CALCIUM SERPL-MCNC: 8.8 MG/DL (ref 8.5–10.1)
CHLORIDE SERPL-SCNC: 96 MMOL/L (ref 97–108)
CO2 SERPL-SCNC: 29 MMOL/L (ref 21–32)
CREAT SERPL-MCNC: 0.84 MG/DL (ref 0.7–1.3)
GLUCOSE SERPL-MCNC: 115 MG/DL (ref 65–100)
MAGNESIUM SERPL-MCNC: 2.2 MG/DL (ref 1.6–2.4)
PHOSPHATE SERPL-MCNC: 3.7 MG/DL (ref 2.6–4.7)
POTASSIUM SERPL-SCNC: 4.3 MMOL/L (ref 3.5–5.1)
SODIUM SERPL-SCNC: 132 MMOL/L (ref 136–145)

## 2022-09-02 PROCEDURE — 84100 ASSAY OF PHOSPHORUS: CPT

## 2022-09-02 PROCEDURE — 74011000250 HC RX REV CODE- 250: Performed by: SURGERY

## 2022-09-02 PROCEDURE — 83735 ASSAY OF MAGNESIUM: CPT

## 2022-09-02 PROCEDURE — 74011250637 HC RX REV CODE- 250/637: Performed by: SURGERY

## 2022-09-02 PROCEDURE — 74018 RADEX ABDOMEN 1 VIEW: CPT

## 2022-09-02 PROCEDURE — 74011000258 HC RX REV CODE- 258: Performed by: SURGERY

## 2022-09-02 PROCEDURE — 74011250636 HC RX REV CODE- 250/636: Performed by: SURGERY

## 2022-09-02 PROCEDURE — 74011000250 HC RX REV CODE- 250: Performed by: NURSE PRACTITIONER

## 2022-09-02 PROCEDURE — 65270000029 HC RM PRIVATE

## 2022-09-02 PROCEDURE — 99024 POSTOP FOLLOW-UP VISIT: CPT | Performed by: SURGERY

## 2022-09-02 PROCEDURE — 36415 COLL VENOUS BLD VENIPUNCTURE: CPT

## 2022-09-02 PROCEDURE — 74011250636 HC RX REV CODE- 250/636: Performed by: NURSE PRACTITIONER

## 2022-09-02 PROCEDURE — 80048 BASIC METABOLIC PNL TOTAL CA: CPT

## 2022-09-02 RX ADMIN — Medication 1 AMPULE: at 21:00

## 2022-09-02 RX ADMIN — METRONIDAZOLE 500 MG: 500 INJECTION, SOLUTION INTRAVENOUS at 06:43

## 2022-09-02 RX ADMIN — LEVOFLOXACIN 750 MG: 5 INJECTION, SOLUTION INTRAVENOUS at 12:57

## 2022-09-02 RX ADMIN — POTASSIUM CHLORIDE, DEXTROSE MONOHYDRATE AND SODIUM CHLORIDE 75 ML/HR: 150; 5; 450 INJECTION, SOLUTION INTRAVENOUS at 00:57

## 2022-09-02 RX ADMIN — METRONIDAZOLE 500 MG: 500 INJECTION, SOLUTION INTRAVENOUS at 18:01

## 2022-09-02 RX ADMIN — MAGNESIUM SULFATE HEPTAHYDRATE: 500 INJECTION, SOLUTION INTRAMUSCULAR; INTRAVENOUS at 17:01

## 2022-09-02 RX ADMIN — I.V. FAT EMULSION 500 ML: 20 EMULSION INTRAVENOUS at 21:00

## 2022-09-02 RX ADMIN — Medication 1 AMPULE: at 10:33

## 2022-09-02 RX ADMIN — POTASSIUM CHLORIDE, DEXTROSE MONOHYDRATE AND SODIUM CHLORIDE 75 ML/HR: 150; 5; 450 INJECTION, SOLUTION INTRAVENOUS at 13:04

## 2022-09-02 NOTE — PROGRESS NOTES
SURGERY PROGRESS NOTE      Admit Date: 2022    POD 6 Days Post-Op    Procedure: Procedure(s):  LAPAROTOMY EXPLORATORY, SIGMOID COLECTOMY, APPENDECTOMY      Subjective:     Patient still without bowel function. Having variable NGT outputs with clamping trials. Objective:     Visit Vitals  /81   Pulse 78   Temp 98.1 °F (36.7 °C)   Resp 18   Ht 5' 10\" (1.778 m)   Wt 76.3 kg (168 lb 3.4 oz)   SpO2 93%   BMI 24.14 kg/m²        Temp (24hrs), Av.1 °F (36.7 °C), Min:98 °F (36.7 °C), Max:98.3 °F (36.8 °C)      No intake/output data recorded.  1901 -  0700  In: 6216.2 [I.V.:6216.2]  Out: 4426 [Urine:1060; Drains:17]    Physical Exam:    General:  alert, cooperative, no distress, appears stated age   Abdomen: distended, bowel sounds active, mildly tender   Incision:   healing well, no drainage, no erythema, no hernia, no seroma, no swelling, no dehiscence, incision well approximated           Lab Results   Component Value Date/Time    WBC 12.3 (H) 2022 08:51 AM    HGB 13.6 2022 08:51 AM    HCT 39.4 2022 08:51 AM    PLATELET 578  08:51 AM    .0 (H) 2022 08:51 AM     Lab Results   Component Value Date/Time    GFR est non-AA >60 2022 12:16 AM    GFR est AA >60 2022 12:16 AM    Creatinine 0.84 2022 12:16 AM    BUN 16 2022 12:16 AM    Sodium 132 (L) 2022 12:16 AM    Potassium 4.3 2022 12:16 AM    Chloride 96 (L) 2022 12:16 AM    CO2 29 2022 12:16 AM    Magnesium 2.2 2022 12:16 AM    Phosphorus 3.7 2022 12:16 AM       Assessment:     Principal Problem:    Diverticulitis of colon with perforation (2022)    Recovering as expected- slow progress due to obstruction prior to surgery    Plan: Mobilize a lot. Continue PCA until able to take PO  Removed vessel loop. D/C planning when having return of bowel function.    Continue TPN     Prem Hutchinson NP     NOTE FROM DR Sara LIND  I Rounded with Nurse Practitioner and independently assessed patient with hx and exam and  review appropriate imaging and labs as needed. I  Agree with plan, and review of systems and independent exam as performed by me and nurse practitioner, as outlined above by NP   Continue NG tube clamp trials patient seems to be tolerating with decreasing residuals, plain abdominal x-ray with contrast from CT now in the right colon and transverse colon suggesting this is just ileus, no evidence of abscess on CT scan, will remove virtual ileostomy Vesseloops, KOBY drain serous but will leave KOBY drain until better bowel function to make sure there is no evidence of anastomotic leak as bowel function improved. This is likely ileus rather than mechanical obstruction, would not recommend any surgical intervention at this time, white blood cell count was down to 12,000, electrolytes within reasonable ranges, continue TPN for now. Patient understands the plan.       Face-to-face time and review of imaging and labs: 22 minutes

## 2022-09-02 NOTE — PROGRESS NOTES
Comprehensive Nutrition Assessment    Type and Reason for Visit: Reassess    Nutrition Recommendations/Plan:   Continue TPN D20/5%AA @ 63 ml/hr x 24 hr for now + 500 ml 20% 3X/day which provides daily approx. 1759 kcals/76 g protein/302 g dextrose. Malnutrition Assessment:  Malnutrition Status:  No malnutrition (08/30/22 1613)      Nutrition Assessment:    9/2: Chart reviewed; med noted for POD#6 lap exploratory, sigmoid colectomy, appendectomy. Pt continues with NGT in place and clamping trials underway. Pt remains on TPN D20/5%AA @ 63 ml/hr x 24 hr + 500 ml 20% lipids added today 3X/wk. Pt in overall positive spirits but hopeful to have NGT pulled by Labor Day and to be able to sip a cup of black coffee. RD explained rationale between TPN rate change and addition of lipids. Last Weight Metric  Weight Loss Metrics 8/24/2022   Today's Wt 168 lb 3.4 oz   BMI 24.14 kg/m2      Nutrition Related Findings:    Labs: Na+ 132, lytes and BG stable; Meds: TPN D20/5%AA @ 63 ml/hr + 500 ml 20% lipids 3X/wk Wound Type: Surgical incision (abdomen)    Current Nutrition Intake & Therapies:  Average Meal Intake: NPO     DIET NPO Ice Chips  TPN ADULT - CENTRAL  TPN ADULT - CENTRAL    Anthropometric Measures:  Height: 5' 10\" (177.8 cm)  Ideal Body Weight (IBW): 166 lbs (75 kg)     Current Body Wt:  76.3 kg (168 lb 3.4 oz), 101.3 % IBW. Standing scale  Current BMI (kg/m2): 24.1                          BMI Category: Normal weight (BMI 18.5-24. 9)    Estimated Daily Nutrient Needs:  Energy Requirements Based On: Formula  Weight Used for Energy Requirements: Current  Energy (kcal/day): MSJ 2050 (1590 x 1.3)  Weight Used for Protein Requirements: Current  Protein (g/day): 76g (1gPro/kg)  Method Used for Fluid Requirements: 1 ml/kcal  Fluid (ml/day): 2050mL    Nutrition Diagnosis:   Altered GI function related to altered GI function, altered GI structure as evidenced by NPO or clear liquid status due to medical condition    Nutrition Interventions:   Food and/or Nutrient Delivery: Continue parenteral nutrition  Nutrition Education/Counseling: No recommendations at this time  Coordination of Nutrition Care: Continue to monitor while inpatient       Goals:  Previous Goal Met: Goal(s) achieved  Goals: Tolerate nutrition support at goal rate, by next RD assessment, Initiate PO diet       Nutrition Monitoring and Evaluation:   Behavioral-Environmental Outcomes: None identified  Food/Nutrient Intake Outcomes: Parenteral nutrition intake/tolerance  Physical Signs/Symptoms Outcomes: Biochemical data, Nutrition focused physical findings, Skin, Weight, GI status    Discharge Planning:     Too soon to determine    Sohail Cullen RD  Contact:

## 2022-09-02 NOTE — PROGRESS NOTES
Problem: Falls - Risk of  Goal: *Absence of Falls  Description: Document Mayra Farias Fall Risk and appropriate interventions in the flowsheet.   Outcome: Progressing Towards Goal  Note: Fall Risk Interventions:            Medication Interventions: Teach patient to arise slowly    Elimination Interventions: Call light in reach    History of Falls Interventions: Room close to nurse's station

## 2022-09-02 NOTE — PROGRESS NOTES
End of Shift Note    Bedside shift change report given to GRETA Gottlieb  (oncoming nurse) by Susana Davis RN (offgoing nurse). Report included the following information SBAR, Intake/Output, MAR, Accordion, and Recent Results    Shift worked:  7a to 7p     Shift summary and any significant changes:     Did not tolerate clamping trial, NGT now back to low continuous suction. Remains on TPN. Concerns for physician to address:  None      Zone phone for oncoming shift:   [phone]       Activity:  Activity Level: Ambulate X (#)  Number times ambulated in hallways past shift: 3  Number of times OOB to chair past shift: up ad peggy in room    Cardiac:   Cardiac Monitoring: No      Cardiac Rhythm: Sinus Rhythm    Access:  Current line(s): PICC     Genitourinary:   Urinary status: voiding    Respiratory:   O2 Device: None (Room air)  Chronic home O2 use?: NO  Incentive spirometer at bedside: N/A       GI:  Last Bowel Movement Date: 08/25/22  Current diet:  DIET NPO Ice Chips  TPN ADULT - CENTRAL  TPN ADULT - CENTRAL  Passing flatus: NO  Tolerating current diet: YES       Pain Management:   Patient states pain is manageable on current regimen: YES    Skin:  Urban Score: 21  Interventions: increase time out of bed    Patient Safety:  Fall Score:  Total Score: 1  Interventions: gripper socks       Length of Stay:  Expected LOS: 5d 16h  Actual LOS: [address], RN

## 2022-09-02 NOTE — PROGRESS NOTES
Problem: Falls - Risk of  Goal: *Absence of Falls  Description: Document Ricki Glad Fall Risk and appropriate interventions in the flowsheet.   Outcome: Progressing Towards Goal  Note: Fall Risk Interventions:            Medication Interventions: Teach patient to arise slowly    Elimination Interventions: Call light in reach    History of Falls Interventions: Room close to nurse's station

## 2022-09-02 NOTE — PROGRESS NOTES
End of Shift Note    Bedside shift change report given to April Rothman  (oncoming nurse) by Stef Blanchard LPN (offgoing nurse). Report included the following information SBAR, Intake/Output, MAR, Accordion, and Recent Results    Shift worked:  7p-7a     Shift summary and any significant changes:     No complaints of pain, PCA used as needed. Pt ambulated in hallways x2. New dressing applied on abd. NG clamped @3:15pm - 7:15pm then drain 450ml of green fluids, NG clamped @8:30pm-12:30am then drain 150ml of green fluids, NG clamped @ 12:45am -4:45pm then drain 150 ml of green fluids. NG is clamped now from 4:45am to 8:45 am      Concerns for physician to address:  None      Zone phone for oncoming shift:   [phone]       Activity:  Activity Level: Up ad peggy  Number times ambulated in hallways past shift: 2  Number of times OOB to chair past shift: 0    Cardiac:   Cardiac Monitoring: No      Cardiac Rhythm: Sinus Rhythm    Access:  Current line(s): PICC     Genitourinary:   Urinary status: voiding    Respiratory:   O2 Device: None (Room air)  Chronic home O2 use?: NO  Incentive spirometer at bedside: N/A       GI:  Last Bowel Movement Date: 08/25/22  Current diet:  DIET NPO Ice Chips  TPN ADULT - CENTRAL  Passing flatus: NO  Tolerating current diet: YES       Pain Management:   Patient states pain is manageable on current regimen: YES    Skin:  Urban Score: 20  Interventions: increase time out of bed    Patient Safety:  Fall Score:  Total Score: 1  Interventions: gripper socks       Length of Stay:  Expected LOS: 5d 16h  Actual LOS: [address], VALARIEN

## 2022-09-03 LAB
ANION GAP SERPL CALC-SCNC: 6 MMOL/L (ref 5–15)
BUN SERPL-MCNC: 16 MG/DL (ref 6–20)
BUN/CREAT SERPL: 19 (ref 12–20)
CALCIUM SERPL-MCNC: 8.6 MG/DL (ref 8.5–10.1)
CHLORIDE SERPL-SCNC: 96 MMOL/L (ref 97–108)
CO2 SERPL-SCNC: 32 MMOL/L (ref 21–32)
CREAT SERPL-MCNC: 0.85 MG/DL (ref 0.7–1.3)
GLUCOSE SERPL-MCNC: 116 MG/DL (ref 65–100)
MAGNESIUM SERPL-MCNC: 2.1 MG/DL (ref 1.6–2.4)
PHOSPHATE SERPL-MCNC: 3.5 MG/DL (ref 2.6–4.7)
POTASSIUM SERPL-SCNC: 3.5 MMOL/L (ref 3.5–5.1)
SODIUM SERPL-SCNC: 134 MMOL/L (ref 136–145)

## 2022-09-03 PROCEDURE — 36415 COLL VENOUS BLD VENIPUNCTURE: CPT

## 2022-09-03 PROCEDURE — 74011250636 HC RX REV CODE- 250/636: Performed by: SURGERY

## 2022-09-03 PROCEDURE — 74011250636 HC RX REV CODE- 250/636: Performed by: NURSE PRACTITIONER

## 2022-09-03 PROCEDURE — 74011250637 HC RX REV CODE- 250/637: Performed by: SURGERY

## 2022-09-03 PROCEDURE — 94760 N-INVAS EAR/PLS OXIMETRY 1: CPT

## 2022-09-03 PROCEDURE — 65270000029 HC RM PRIVATE

## 2022-09-03 PROCEDURE — 84100 ASSAY OF PHOSPHORUS: CPT

## 2022-09-03 PROCEDURE — 83735 ASSAY OF MAGNESIUM: CPT

## 2022-09-03 PROCEDURE — 74011000250 HC RX REV CODE- 250: Performed by: SURGERY

## 2022-09-03 PROCEDURE — 80048 BASIC METABOLIC PNL TOTAL CA: CPT

## 2022-09-03 PROCEDURE — 74011000258 HC RX REV CODE- 258: Performed by: SURGERY

## 2022-09-03 RX ADMIN — POTASSIUM CHLORIDE, DEXTROSE MONOHYDRATE AND SODIUM CHLORIDE 75 ML/HR: 150; 5; 450 INJECTION, SOLUTION INTRAVENOUS at 07:45

## 2022-09-03 RX ADMIN — Medication 1 AMPULE: at 21:00

## 2022-09-03 RX ADMIN — METRONIDAZOLE 500 MG: 500 INJECTION, SOLUTION INTRAVENOUS at 07:41

## 2022-09-03 RX ADMIN — LEVOFLOXACIN 750 MG: 5 INJECTION, SOLUTION INTRAVENOUS at 12:40

## 2022-09-03 RX ADMIN — POTASSIUM CHLORIDE, DEXTROSE MONOHYDRATE AND SODIUM CHLORIDE 75 ML/HR: 150; 5; 450 INJECTION, SOLUTION INTRAVENOUS at 17:49

## 2022-09-03 RX ADMIN — Medication 1 AMPULE: at 07:39

## 2022-09-03 RX ADMIN — MAGNESIUM SULFATE HEPTAHYDRATE: 500 INJECTION, SOLUTION INTRAMUSCULAR; INTRAVENOUS at 17:45

## 2022-09-03 RX ADMIN — METRONIDAZOLE 500 MG: 500 INJECTION, SOLUTION INTRAVENOUS at 21:53

## 2022-09-03 NOTE — PROGRESS NOTES
Problem: Falls - Risk of  Goal: *Absence of Falls  Description: Document Leafy Varela Fall Risk and appropriate interventions in the flowsheet.   Outcome: Progressing Towards Goal  Note: Fall Risk Interventions:            Medication Interventions: Teach patient to arise slowly    Elimination Interventions: Call light in reach    History of Falls Interventions: Room close to nurse's station

## 2022-09-03 NOTE — PROGRESS NOTES
End of Shift Note    Bedside shift change report given to GRETA Gottlieb  (oncoming nurse) by Sailaja Dover RN (offgoing nurse). Report included the following information SBAR, Intake/Output, MAR, Accordion, and Recent Results    Shift worked:  7a to 7p     Shift summary and any significant changes:     Passing flatus!!! Passed small dark stool!!!!!  NGT remains to low continuous suction. Concerns for physician to address:  None      Zone phone for oncoming shift:   [phone]       Activity:  Activity Level: Ambulate X (#)  Number times ambulated in hallways past shift: 3+  Number of times OOB to chair past shift: up ad peggy in room    Cardiac:   Cardiac Monitoring: No      Cardiac Rhythm: Sinus Rhythm    Access:  Current line(s): PICC     Genitourinary:   Urinary status: voiding    Respiratory:   O2 Device: None (Room air)  Chronic home O2 use?: NO  Incentive spirometer at bedside: N/A       GI:  Last Bowel Movement Date: 08/25/22  Current diet:  DIET NPO Ice Chips  TPN ADULT - CENTRAL  TPN ADULT - CENTRAL  Passing flatus: NO  Tolerating current diet: YES       Pain Management:   Patient states pain is manageable on current regimen: YES    Skin:  Urban Score: 22  Interventions: increase time out of bed    Patient Safety:  Fall Score:  Total Score: 1  Interventions: gripper socks       Length of Stay:  Expected LOS: 5d 16h  Actual LOS: [address] RN

## 2022-09-03 NOTE — PROGRESS NOTES
Admit Date: 2022    POD 7 Days Post-Op    Procedure:  Procedure(s):  LAPAROTOMY EXPLORATORY, SIGMOID COLECTOMY, APPENDECTOMY    Subjective:     Patient has no new complaints. Anxious to get ngt out. Had some flatus this morning for the first time. Failed clamping trials yesterday. Objective:     Blood pressure 128/84, pulse (!) 101, temperature 97.5 °F (36.4 °C), resp. rate 18, height 5' 10\" (1.778 m), weight 168 lb 3.4 oz (76.3 kg), SpO2 92 %.     Temp (24hrs), Av °F (36.7 °C), Min:97.5 °F (36.4 °C), Max:98.4 °F (36.9 °C)      Physical Exam:  GENERAL: alert, cooperative, no distress, appears stated age, LUNG: clear to auscultation bilaterally, HEART: regular rate and rhythm, ABDOMEN: soft, NT, wound c/d/i, EXTREMITIES:  extremities normal, atraumatic, no cyanosis or edema    Labs:   Recent Results (from the past 24 hour(s))   METABOLIC PANEL, BASIC    Collection Time: 22  3:48 AM   Result Value Ref Range    Sodium 134 (L) 136 - 145 mmol/L    Potassium 3.5 3.5 - 5.1 mmol/L    Chloride 96 (L) 97 - 108 mmol/L    CO2 32 21 - 32 mmol/L    Anion gap 6 5 - 15 mmol/L    Glucose 116 (H) 65 - 100 mg/dL    BUN 16 6 - 20 MG/DL    Creatinine 0.85 0.70 - 1.30 MG/DL    BUN/Creatinine ratio 19 12 - 20      GFR est AA >60 >60 ml/min/1.73m2    GFR est non-AA >60 >60 ml/min/1.73m2    Calcium 8.6 8.5 - 10.1 MG/DL   PHOSPHORUS    Collection Time: 22  3:48 AM   Result Value Ref Range    Phosphorus 3.5 2.6 - 4.7 MG/DL   MAGNESIUM    Collection Time: 22  3:48 AM   Result Value Ref Range    Magnesium 2.1 1.6 - 2.4 mg/dL       Data Review images and reports reviewed    Assessment:     Principal Problem:    Diverticulitis of colon with perforation (2022)        Plan/Recommendations/Medical Decision Making:     Continue present treatment  NGT until return of bowel function  Increase activity  Continue tpn    Mateo Cardozo MD  [facility]

## 2022-09-04 LAB
ANION GAP SERPL CALC-SCNC: 7 MMOL/L (ref 5–15)
BUN SERPL-MCNC: 23 MG/DL (ref 6–20)
BUN/CREAT SERPL: 26 (ref 12–20)
CALCIUM SERPL-MCNC: 8.8 MG/DL (ref 8.5–10.1)
CHLORIDE SERPL-SCNC: 96 MMOL/L (ref 97–108)
CO2 SERPL-SCNC: 33 MMOL/L (ref 21–32)
CREAT SERPL-MCNC: 0.89 MG/DL (ref 0.7–1.3)
GLUCOSE SERPL-MCNC: 88 MG/DL (ref 65–100)
MAGNESIUM SERPL-MCNC: 2.1 MG/DL (ref 1.6–2.4)
PHOSPHATE SERPL-MCNC: 4.2 MG/DL (ref 2.6–4.7)
POTASSIUM SERPL-SCNC: 3.4 MMOL/L (ref 3.5–5.1)
SODIUM SERPL-SCNC: 136 MMOL/L (ref 136–145)

## 2022-09-04 PROCEDURE — 83735 ASSAY OF MAGNESIUM: CPT

## 2022-09-04 PROCEDURE — 94760 N-INVAS EAR/PLS OXIMETRY 1: CPT

## 2022-09-04 PROCEDURE — 74011250637 HC RX REV CODE- 250/637: Performed by: SURGERY

## 2022-09-04 PROCEDURE — 36415 COLL VENOUS BLD VENIPUNCTURE: CPT

## 2022-09-04 PROCEDURE — 65270000029 HC RM PRIVATE

## 2022-09-04 PROCEDURE — 77010033678 HC OXYGEN DAILY

## 2022-09-04 PROCEDURE — 74011250636 HC RX REV CODE- 250/636: Performed by: SURGERY

## 2022-09-04 PROCEDURE — 80048 BASIC METABOLIC PNL TOTAL CA: CPT

## 2022-09-04 PROCEDURE — 74011250636 HC RX REV CODE- 250/636: Performed by: NURSE PRACTITIONER

## 2022-09-04 PROCEDURE — 84100 ASSAY OF PHOSPHORUS: CPT

## 2022-09-04 RX ADMIN — Medication 1 AMPULE: at 07:43

## 2022-09-04 RX ADMIN — LEVOFLOXACIN 750 MG: 5 INJECTION, SOLUTION INTRAVENOUS at 12:30

## 2022-09-04 RX ADMIN — METRONIDAZOLE 500 MG: 500 INJECTION, SOLUTION INTRAVENOUS at 20:33

## 2022-09-04 RX ADMIN — METRONIDAZOLE 500 MG: 500 INJECTION, SOLUTION INTRAVENOUS at 07:42

## 2022-09-04 RX ADMIN — POTASSIUM CHLORIDE, DEXTROSE MONOHYDRATE AND SODIUM CHLORIDE 75 ML/HR: 150; 5; 450 INJECTION, SOLUTION INTRAVENOUS at 11:33

## 2022-09-04 RX ADMIN — Medication 1 AMPULE: at 21:56

## 2022-09-04 NOTE — PROGRESS NOTES
End of Shift Note    Bedside shift change report given to Ozzy GARCIA GRETA Pierre  (oncoming nurse) by Shadi Ramos RN (offgoing nurse). Report included the following information SBAR, Intake/Output, MAR, Accordion, and Recent Results    Shift worked:  7a to 7p     Shift summary and any significant changes:     NGt removed today, diet advanced to clear liquids. TPN weaned off today     Concerns for physician to address:  None      Zone phone for oncoming shift:   [phone]       Activity:  Activity Level: Up ad peggy  Number times ambulated in hallways past shift: 3+  Number of times OOB to chair past shift: up ad peggy in room    Cardiac:   Cardiac Monitoring: No      Cardiac Rhythm: Sinus Rhythm    Access:  Current line(s): PICC     Genitourinary:   Urinary status: voiding    Respiratory:   O2 Device: None (Room air)  Chronic home O2 use?: NO  Incentive spirometer at bedside: N/A       GI:  Last Bowel Movement Date: 09/03/22  Current diet:  ADULT DIET Clear Liquid  TPN ADULT - CENTRAL  Passing flatus: NO  Tolerating current diet: YES       Pain Management:   Patient states pain is manageable on current regimen: YES    Skin:  Urban Score: 22  Interventions: increase time out of bed    Patient Safety:  Fall Score:  Total Score: 0  Interventions: gripper socks       Length of Stay:  Expected LOS: 5d 16h  Actual LOS: [address] RN

## 2022-09-04 NOTE — PROGRESS NOTES
Problem: Falls - Risk of  Goal: *Absence of Falls  Description: Document Clydene Bone Fall Risk and appropriate interventions in the flowsheet.   Outcome: Progressing Towards Goal  Note: Fall Risk Interventions:            Medication Interventions: Teach patient to arise slowly    Elimination Interventions: Call light in reach    History of Falls Interventions: Room close to nurse's station

## 2022-09-04 NOTE — PROGRESS NOTES
Admit Date: 2022    POD 8 Days Post-Op    Procedure:  Procedure(s):  LAPAROTOMY EXPLORATORY, SIGMOID COLECTOMY, APPENDECTOMY    Subjective:     Patient has no new complaints. Anxious to get ngt out. Has had multiple BMs overnight    Objective:     Blood pressure 119/77, pulse 78, temperature 98.2 °F (36.8 °C), resp. rate 18, height 5' 10\" (1.778 m), weight 168 lb 3.4 oz (76.3 kg), SpO2 93 %.     Temp (24hrs), Av.9 °F (36.6 °C), Min:97.4 °F (36.3 °C), Max:98.2 °F (36.8 °C)      Physical Exam:  GENERAL: alert, cooperative, no distress, appears stated age, LUNG: clear to auscultation bilaterally, HEART: regular rate and rhythm, ABDOMEN: soft, NT, wound c/d/i, EXTREMITIES:  extremities normal, atraumatic, no cyanosis or edema    Labs:   Recent Results (from the past 24 hour(s))   METABOLIC PANEL, BASIC    Collection Time: 22  3:42 AM   Result Value Ref Range    Sodium 136 136 - 145 mmol/L    Potassium 3.4 (L) 3.5 - 5.1 mmol/L    Chloride 96 (L) 97 - 108 mmol/L    CO2 33 (H) 21 - 32 mmol/L    Anion gap 7 5 - 15 mmol/L    Glucose 88 65 - 100 mg/dL    BUN 23 (H) 6 - 20 MG/DL    Creatinine 0.89 0.70 - 1.30 MG/DL    BUN/Creatinine ratio 26 (H) 12 - 20      GFR est AA >60 >60 ml/min/1.73m2    GFR est non-AA >60 >60 ml/min/1.73m2    Calcium 8.8 8.5 - 10.1 MG/DL   PHOSPHORUS    Collection Time: 22  3:42 AM   Result Value Ref Range    Phosphorus 4.2 2.6 - 4.7 MG/DL   MAGNESIUM    Collection Time: 22  3:42 AM   Result Value Ref Range    Magnesium 2.1 1.6 - 2.4 mg/dL       Data Review images and reports reviewed    Assessment:     Principal Problem:    Diverticulitis of colon with perforation (2022)      Plan/Recommendations/Medical Decision Making:     Dc ngt  Wean tpn  Clear liquids  Advance diet as tolerated and home soon    Marie Loza MD  [facility]

## 2022-09-05 VITALS
DIASTOLIC BLOOD PRESSURE: 81 MMHG | TEMPERATURE: 98.1 F | RESPIRATION RATE: 18 BRPM | HEART RATE: 84 BPM | WEIGHT: 168.21 LBS | OXYGEN SATURATION: 97 % | BODY MASS INDEX: 24.08 KG/M2 | SYSTOLIC BLOOD PRESSURE: 116 MMHG | HEIGHT: 70 IN

## 2022-09-05 LAB
ANION GAP SERPL CALC-SCNC: 7 MMOL/L (ref 5–15)
BUN SERPL-MCNC: 19 MG/DL (ref 6–20)
BUN/CREAT SERPL: 20 (ref 12–20)
CALCIUM SERPL-MCNC: 8.1 MG/DL (ref 8.5–10.1)
CHLORIDE SERPL-SCNC: 97 MMOL/L (ref 97–108)
CO2 SERPL-SCNC: 29 MMOL/L (ref 21–32)
CREAT SERPL-MCNC: 0.93 MG/DL (ref 0.7–1.3)
GLUCOSE SERPL-MCNC: 91 MG/DL (ref 65–100)
MAGNESIUM SERPL-MCNC: 1.9 MG/DL (ref 1.6–2.4)
PHOSPHATE SERPL-MCNC: 3.2 MG/DL (ref 2.6–4.7)
POTASSIUM SERPL-SCNC: 3.6 MMOL/L (ref 3.5–5.1)
SODIUM SERPL-SCNC: 133 MMOL/L (ref 136–145)

## 2022-09-05 PROCEDURE — 74011250636 HC RX REV CODE- 250/636: Performed by: NURSE PRACTITIONER

## 2022-09-05 PROCEDURE — 74011250637 HC RX REV CODE- 250/637: Performed by: SURGERY

## 2022-09-05 PROCEDURE — 84100 ASSAY OF PHOSPHORUS: CPT

## 2022-09-05 PROCEDURE — 36415 COLL VENOUS BLD VENIPUNCTURE: CPT

## 2022-09-05 PROCEDURE — 83735 ASSAY OF MAGNESIUM: CPT

## 2022-09-05 PROCEDURE — 80048 BASIC METABOLIC PNL TOTAL CA: CPT

## 2022-09-05 RX ORDER — OXYCODONE HYDROCHLORIDE 5 MG/1
5 TABLET ORAL
Qty: 12 TABLET | Refills: 0 | Status: SHIPPED | OUTPATIENT
Start: 2022-09-05 | End: 2022-09-08

## 2022-09-05 RX ORDER — CIPROFLOXACIN 500 MG/1
500 TABLET ORAL 2 TIMES DAILY
Qty: 14 TABLET | Refills: 0 | Status: SHIPPED | OUTPATIENT
Start: 2022-09-05 | End: 2022-09-12

## 2022-09-05 RX ORDER — METRONIDAZOLE 500 MG/1
500 TABLET ORAL 3 TIMES DAILY
Qty: 21 TABLET | Refills: 0 | Status: SHIPPED | OUTPATIENT
Start: 2022-09-05 | End: 2022-09-12

## 2022-09-05 RX ADMIN — METRONIDAZOLE 500 MG: 500 INJECTION, SOLUTION INTRAVENOUS at 09:58

## 2022-09-05 RX ADMIN — Medication 1 AMPULE: at 09:59

## 2022-09-05 RX ADMIN — POTASSIUM CHLORIDE, DEXTROSE MONOHYDRATE AND SODIUM CHLORIDE 75 ML/HR: 150; 5; 450 INJECTION, SOLUTION INTRAVENOUS at 02:21

## 2022-09-05 NOTE — DISCHARGE SUMMARY
Post- Surgical Discharge Summary    Patient ID:  Hernandez Mcrae  [id number]  male  61 y.o.  1965    Admit date: 8/24/2022    Discharge date: 09/05/22     Admitting Physician: Ariana Guillen MD     Consulting Physician(s):   Treatment Team: Attending Provider: Zena Flores MD; Consulting Provider: Zena Flores MD; Utilization Review: Mark Martin; Care Manager: Flor Decker; Staff Nurse: Madhav Pineda RN    Date of Surgery:   8/27/2022     Preoperative Diagnosis:  Perforated diverticulitis sigmoid colon ([address] [K63.1]    Postoperative Diagnosis:   Perforated diverticulitis sigmoid colon ([address] [K63.1]    Procedure(s):  LAPAROTOMY EXPLORATORY, SIGMOID COLECTOMY, APPENDECTOMY     Anesthesia Type:   General     Surgeon: Hodan Harris MD                            HPI:  Pt is a 61 y.o. male who has a history of Perforated diverticulitis sigmoid colon ([address] [K63.1] who presents at this time for a medical management in the inpatient setting. Problem List:   Problem List as of 9/5/2022 Date Reviewed: 8/24/2022            Codes Class Noted - Resolved    * (Principal) Diverticulitis of colon with perforation ICD-10-CM: K57.20  ICD-9-CM: 562.11  8/24/2022 - Present            Hospital Course:  Patient was managed conservatively with bowel rest and antibiotics but failed to improved as expected. He presented with obstructive symptoms which did not improve. The patient underwent surgery. Intra-operative complications: None; patient tolerated the procedure well. Was taken to the PACU in stable condition and then transferred to the surgical floor. His post-op course was slow due to the fact that he was obstructed prior to surgery and then he had slow recovery of bowel function. He did recover however and at the time of discharge was tolerating a diet and having bowel functio. Pathology is final. No additional intervention needed.      Perioperative Antibiotics: Levaquin and Metronidazole    Postoperative Pain Management:  Oxycodone    Postoperative transfusions:    Number of units banked PRBCs =   none     Post Op complications: None    Incisions  - clean, dry and intact. No significant erythema or swelling. Wound(s) appear to be healing without any evidence of infection. Patient mobilized with nursing and was found to be safe and steady with ambulation. Discharged to: Home     Condition on Discharge: Stable     Discharge instructions:    - Take pain medications as prescribed  - Diet Low Fiber  - Discharge activity:    - Activity as tolerated    - Ambulate several times a day   - No heavy lifting for 4 weeks   - Do not drive for two weeks or while on opioid pain medications  - Wound Care: Keep wound(s) clean and dry. See discharge instruction sheet. Allergies:  No Known Allergies           -DISCHARGE MEDICATION LIST     Current Discharge Medication List        START taking these medications    Details   ciprofloxacin HCl (CIPRO) 500 mg tablet Take 1 Tablet by mouth two (2) times a day for 7 days. Qty: 14 Tablet, Refills: 0  Start date: 9/5/2022, End date: 9/12/2022      metroNIDAZOLE (FLAGYL) 500 mg tablet Take 1 Tablet by mouth three (3) times daily for 7 days. Qty: 21 Tablet, Refills: 0  Start date: 9/5/2022, End date: 9/12/2022          per medical continuation form      -Follow up in office in 2 weeks      Signed:  Wicho Cruz.  Jace Posadas  MSN, APRN, FNP-C, [facility]  Surgical Nurse Practitioner    9/5/2022  9:12 AM

## 2022-09-05 NOTE — DISCHARGE INSTRUCTIONS
Open Bowel Resection: What to Expect at [address] [place] [address] Drive are likely to have pain that comes and goes for the next few days after bowel surgery. You may have bowel cramps, and your cut (incision) may hurt. You may also feel like you have the flu. You may have a low fever and feel tired and nauseated. This is common. You should feel better after a week and will probably be back to normal in 2 to 3 weeks. This care sheet gives you a general idea about how long it will take for you to recover. But each person recovers at a different pace. Follow the steps below to get better as quickly as possible. How can you care for yourself at home? Activity  Rest when you feel tired. Getting enough sleep will help you recover. Try to walk each day. Start by walking a little more than you did the day before. Bit by bit, increase the amount you walk. Walking boosts blood flow and helps prevent pneumonia and constipation. Avoid strenuous activities, such as biking, jogging, weight lifting, or aerobic exercise, until your doctor says it is okay. Ask your doctor when you can drive again. You will probably need to take 3 to 4 weeks off from work. It depends on the type of work you do and how you feel. You may need to take off 4 to 6 weeks if you lift heavy objects in your job. You may shower 24 to 48 hours after surgery, if your doctor says it is okay. Pat the cut (incision) dry. Do not take a bath for the first 2 weeks, or until your doctor tells you it is okay. Ask your doctor when it is okay for you to have sex. Diet  You may not have much appetite after the surgery. But try to eat a healthy diet. Your doctor will tell you about any foods you should not eat. Eat a low-fiber diet for several weeks after surgery. Eat many small meals throughout the day. Add high-fiber foods a little at a time. Eat yogurt. It puts good bacteria into your colon and helps prevent diarrhea.   Try to avoid nuts, seeds, and corn for a while. They may be hard to digest.  You may need to take vitamins that contain sodium and potassium. Ask your doctor. Drink plenty of fluids to avoid becoming dehydrated. Medicines  Your doctor will tell you if and when you can restart your medicines. He or she will also give you instructions about taking any new medicines. If you take blood thinners, such as warfarin (Coumadin), clopidogrel (Plavix), or aspirin, be sure to talk to your doctor. He or she will tell you if and when to start taking those medicines again. Make sure that you understand exactly what your doctor wants you to do. Take pain medicines exactly as directed. If the doctor gave you a prescription medicine for pain, take it as prescribed. If you are not taking a prescription pain medicine, ask your doctor if you can take an over-the-counter medicine. Do not take two or more pain medicines at the same time unless the doctor told you to. Many pain medicines have acetaminophen, which is Tylenol. Too much acetaminophen (Tylenol) can be harmful. If you think your pain medicine is making you sick to your stomach: Take your medicine after meals (unless your doctor tells you not to). Ask your doctor for a different pain medicine. If your doctor prescribed antibiotics, take them as directed. Do not stop taking them just because you feel better. You need to take the full course of antibiotics. You may need to take some medicines in a different form. You will be told whether to crush pills or take a liquid form of the medicine. If your doctor gives you a stool softener, take it as directed. Incision care  If you have strips of tape on the incision, leave the tape on for a week or until it falls off. Wash the area daily with warm, soapy water, and pat it dry. Follow-up care is a key part of your treatment and safety. Be sure to make and go to all appointments, and call your doctor if you are having problems.  It's also a good idea to know your test results and keep a list of the medicines you take. When should you call for help? Call 911 anytime you think you may need emergency care. For example, call if:  You passed out (lost consciousness). You have sudden chest pain and shortness of breath, or you cough up blood. You have severe pain in your belly. Call your doctor now or seek immediate medical care if:  You are sick to your stomach and cannot drink fluids or keep them down. You have signs of a blood clot, such as:  Pain in your calf, back of the knee, thigh, or groin. Redness and swelling in your leg or groin. You have a lot of diarrhea that smells very bad. You have trouble passing urine or stool, especially if you have mild pain or swelling in your lower belly. You have signs of infection, such as: Increased pain, swelling, warmth, or redness. Red streaks leading from the incision. Pus draining from the incision. A fever. You have pain that does not get better after you take pain medicine. You have loose stitches, or your incision comes open. You are bleeding or have new drainage from the incision. Watch closely for any changes in your health, and be sure to contact your doctor if:  You do not have a bowel movement after taking a laxative. You do not get better as expected. Where can you learn more? Go to http://www.gray.com/. Enter [id number] [id number] in the search box to learn more about \"Open Bowel Resection: What to Expect at Home. \"  Current as of: August 9, 2016  Content Version: 11.3  © 9314-8987 [employer]. Care instructions adapted under license by [employer] (which disclaims liability or warranty for this information). If you have questions about a medical condition or this instruction, always ask your healthcare professional. [address] any warranty or liability for your use of this information.

## 2022-09-05 NOTE — PROGRESS NOTES
Problem: Falls - Risk of  Goal: *Absence of Falls  Description: Document [place] Fall Risk and appropriate interventions in the flowsheet.   Outcome: Progressing Towards Goal  Note: Fall Risk Interventions:            Medication Interventions: Teach patient to arise slowly    Elimination Interventions: Call light in reach    History of Falls Interventions: Room close to nurse's station

## 2022-09-05 NOTE — PROGRESS NOTES
Problem: Patient Education: Go to Patient Education Activity  Goal: Patient/Family Education  Outcome: Progressing Towards Goal     Problem: Falls - Risk of  Goal: *Absence of Falls  Description: Document [place] Fall Risk and appropriate interventions in the flowsheet.   Outcome: Progressing Towards Goal  Note: Fall Risk Interventions:            Medication Interventions: Teach patient to arise slowly    Elimination Interventions: Call light in reach    History of Falls Interventions: Room close to nurse's station

## 2022-09-05 NOTE — PROGRESS NOTES
End of Shift Note    Bedside shift change report given to Hermelindo Marquez RN (oncoming nurse) by Fior Lopez LPN (offgoing nurse). Report included the following information SBAR    Shift worked:  7p-7a     Shift summary and any significant changes:    Pt rested well throughout the night, has walked the halls a few times this morning and tolerating well. Dressing are intact. Morning labs done. Uneventful shift. Concerns for physician to address: None     Zone phone for oncoming shift:  [phone]       Activity:  Activity Level: Up ad peggy  Number times ambulated in hallways past shift: 3  Number of times OOB to chair past shift: 0    Cardiac:   Cardiac Monitoring: No      Cardiac Rhythm: Sinus Rhythm    Access:  Current line(s): PICC     Genitourinary:   Urinary status: voiding    Respiratory:   O2 Device: None (Room air)  Chronic home O2 use?: NO  Incentive spirometer at bedside: YES       GI:  Last Bowel Movement Date: 09/04/22  Current diet:  ADULT DIET Clear Liquid  Passing flatus: YES  Tolerating current diet: YES       Pain Management:   Patient states pain is manageable on current regimen: YES    Skin:  Urban Score: 22  Interventions: increase time out of bed    Patient Safety:  Fall Score:  Total Score: 1  Interventions: gripper socks       Length of Stay:  Expected LOS: 5d 16h  Actual LOS: Kimmy Worrell LPN

## 2022-09-05 NOTE — PROGRESS NOTES
Discharge instructions reviewed with patient, verbalized understanding. Copy of discharge instructions given to patient. PICC line removed without incident, pressure was held and tegaderm applied over gauze. Dr. Ashvin Stewart and Saadia Olmstead NP notified that patient needs a PRN pain medication sent to the pharmacy as well. Patient has all of his belongings. Patient ambulatory at discharge with his friend. No c/o pain or distress.  No n/v/d.

## 2022-09-05 NOTE — PROGRESS NOTES
Transition of Care Plan:     RUR: 7% low risk for readmission   Disposition: Home   Follow up appointments: PCP-encouraged to establish; Surgeon  DME needed: None anticipated  Transportation at Discharge: Driving self home   Tollie Lulas or means to access home: Has access  IM Medicare Letter: N/A - Commercial coverage  Is patient a [place] and connected with the [facility]? N/A              If yes, was [employer] transfer form completed and [facility] notified? Caregiver Contact: Pt's son, Alejandra Terrazas) [phone]  Discharge Caregiver contacted prior to discharge? no  Care Conference needed?:   No      Pt is medically stable for dc home today. No dc needs identified by MD or staff  O/P f/up as indicated on AVS.    Care Management Interventions  PCP Verified by CM: No  Palliative Care Criteria Met (RRAT>21 & CHF Dx)?: No  Mode of Transport at Discharge:  Other (see comment)  Transition of Care Consult (CM Consult): Discharge Planning  Discharge Durable Medical Equipment: No  Physical Therapy Consult: Yes  Occupational Therapy Consult: Yes  Speech Therapy Consult: No  Support Systems: Child(renee)  Confirm Follow Up Transport: Family  Discharge Location  Patient Expects to be Discharged to[de-identified] Home with outpatient services     NORBERTO Mejia

## 2022-09-08 ENCOUNTER — TELEPHONE (OUTPATIENT)
Dept: SURGERY | Age: 57
End: 2022-09-08

## 2022-09-08 NOTE — TELEPHONE ENCOUNTER
Pt calling in requesting a refill on his prescription for the pain med he was on. Pt requesting a call from nurse.

## 2022-09-09 NOTE — TELEPHONE ENCOUNTER
Pt calling again stating he is trying to get his pain med refilled but has not heard from anyone. Pt states he has not heard from anyone regarding this and needs someone to call him back ASAP. Please advise.

## 2022-09-09 NOTE — TELEPHONE ENCOUNTER
Pt called again trying to get prescription refilled and is in a lot of pain  Please call back [phone]

## 2022-09-12 ENCOUNTER — TELEPHONE (OUTPATIENT)
Dept: SURGERY | Age: 57
End: 2022-09-12

## 2022-09-12 NOTE — TELEPHONE ENCOUNTER
Spoke with patient normal to have these symptoms  post op. Encourage activity as tolerated, watch diet low fat no fried foods. States he felt better after BM last evening. Appointment scheduled Thursday 9/15/20.

## 2022-09-12 NOTE — TELEPHONE ENCOUNTER
Patient had lap explo sigmoid colectomy and appendectomy on 8/27/22. Patient calling with concern. Patient has pain,discomfort in lower abdomen,bloating, a lot of belching. Patient states no fever and blood pressure is low/normal.     Patient would a return call back today to see if this is normal or if he needs to do something different.

## 2022-09-15 ENCOUNTER — OFFICE VISIT (OUTPATIENT)
Dept: SURGERY | Age: 57
End: 2022-09-15
Payer: COMMERCIAL

## 2022-09-15 VITALS
DIASTOLIC BLOOD PRESSURE: 58 MMHG | RESPIRATION RATE: 20 BRPM | HEART RATE: 86 BPM | HEIGHT: 70 IN | OXYGEN SATURATION: 95 % | BODY MASS INDEX: 22.62 KG/M2 | SYSTOLIC BLOOD PRESSURE: 92 MMHG | WEIGHT: 158 LBS | TEMPERATURE: 97.3 F

## 2022-09-15 DIAGNOSIS — Z09 POSTOPERATIVE EXAMINATION: Primary | ICD-10-CM

## 2022-09-15 PROCEDURE — 99024 POSTOP FOLLOW-UP VISIT: CPT | Performed by: SURGERY

## 2022-09-15 RX ORDER — IRBESARTAN 300 MG/1
300 TABLET ORAL DAILY
COMMUNITY
Start: 2022-08-08

## 2022-09-15 NOTE — PROGRESS NOTES
Identified pt with two pt identifiers(name and ). Reviewed record in preparation for visit and have obtained necessary documentation. All patient medications has been reviewed. Chief Complaint   Patient presents with    Surgical Follow-up     Sigmoid colectomy with primary anastomosis and incidental appendectomy. 22          Health Maintenance Due   Topic    Hepatitis C Screening     Depression Screen     COVID-19 Vaccine (1)    Colorectal Cancer Screening Combo     Shingrix Vaccine Age 50> (2 of 2)    Flu Vaccine (1)       Vitals:    09/15/22 1441   BP: (!) 92/58   Pulse: 86   Resp: 20   Temp: 97.3 °F (36.3 °C)   SpO2: 95%   Weight: 71.7 kg (158 lb)   Height: 5' 10\" (1.778 m)   PainSc:   0 - No pain       4. Have you been to the ER, urgent care clinic since your last visit? Hospitalized since your last visit? No    5. Have you seen or consulted any other health care providers outside of the [address] [place] [address] since your last visit? Include any pap smears or colon screening. No      Patient is accompanied by self I have received verbal consent from Raymond Llanes to discuss any/all medical information while they are present in the room.

## 2022-09-16 NOTE — PROGRESS NOTES
Postop Progress Note    Subjective    Shazia Jordan presents to the office 3 weeks  following sigmoid coloectomy for acute diverticulitis . Eating a regular diet without difficulty. , Bowel movements are Normal., Patient reports wound healing well. , and The patient is not having any pain. Objective    Visit Vitals  BP (!) 92/58 (BP 1 Location: Right upper arm, BP Patient Position: Sitting, BP Cuff Size: Adult)   Pulse 86   Temp 97.3 °F (36.3 °C)   Resp 20   Ht 5' 10\" (1.778 m)   Wt 71.7 kg (158 lb)   SpO2 95%   BMI 22.67 kg/m²     General: alert, cooperative, no distress, appears stated age  Incision: healing well, no drainage, no erythema, no hernia, no seroma, no swelling, well approximated, staples removed     Assessment    Doing well postoperatively. Plan   1. Continue any current medications  2. Wound care discussed  3. Wound/Incision: healing well  4. Disposition:   Pt is to increase activities as tolerated., No heavy lifting., and No strenuous exercise. May return to light duty immediately with the following restrictions: lifting/carrying not to exceed 20 lbs. .  5. Diet: Regular Diet  6.  Follow up:  PRN          Electronically signed by Modesto Pabon MD on 9/16/2022 at 11:31 AM

## 2023-04-19 ENCOUNTER — TRANSCRIBE ORDER (OUTPATIENT)
Dept: SCHEDULING | Age: 58
End: 2023-04-19

## 2023-04-19 DIAGNOSIS — R10.32 ABDOMINAL PAIN, LEFT LOWER QUADRANT: Primary | ICD-10-CM

## 2023-05-09 ENCOUNTER — HOSPITAL ENCOUNTER (OUTPATIENT)
Facility: HOSPITAL | Age: 58
Discharge: HOME | End: 2023-05-12
Payer: COMMERCIAL

## 2023-05-09 DIAGNOSIS — R10.32 ABDOMINAL PAIN, LEFT LOWER QUADRANT: ICD-10-CM

## 2023-05-09 PROCEDURE — 6360000004 HC RX CONTRAST MEDICATION: Performed by: FAMILY MEDICINE

## 2023-05-09 PROCEDURE — 74177 CT ABD & PELVIS W/CONTRAST: CPT

## 2023-05-09 RX ADMIN — IOPAMIDOL 100 ML: 755 INJECTION, SOLUTION INTRAVENOUS at 16:16

## 2025-07-15 NOTE — PERIOP NOTES
Handoff Report from Operating Room to PACU    Report received from [facility] [facility]  and Mariana Proud CRNA regarding Jose Maria Kumar. Surgeon(s):  Kaleb Hendrix MD  And Procedure(s) (LRB):  LAPAROTOMY EXPLORATORY, SIGMOID COLECTOMY, APPENDECTOMY (N/A)  confirmed   with drains and dressings discussed. Anesthesia type, drugs, patient history, complications, estimated blood loss, vital signs, intake and output, and last pain medication and reversal medications were reviewed. finger finger

## (undated) DEVICE — SUTURE ETHLN SZ 2-0 L18IN NONABSORBABLE BLK L19MM PS-2 PRIM 593H

## (undated) DEVICE — SUTURE PERMAHAND SZ 3-0 L18IN NONABSORBABLE BLK L26MM SH C013D

## (undated) DEVICE — GLOVE ORANGE PI 7 1/2   MSG9075

## (undated) DEVICE — SUTURE PERMAHAND SZ 3-0 L30IN NONABSORBABLE BLK SILK BRAID A304H

## (undated) DEVICE — SOLUTION IRRIG 1000ML 0.9% SOD CHL USP POUR PLAS BTL

## (undated) DEVICE — YANKAUER,POOLE TIP,STERILE,50/CS: Brand: MEDLINE

## (undated) DEVICE — GLOVE ORTHO 7 1/2   MSG9475

## (undated) DEVICE — SUTURE PERMAHAND SZ 2-0 L30IN NONABSORBABLE BLK SILK W/O A305H

## (undated) DEVICE — DRAIN SURG 19FR L025IN DIA63MM SIL CHN RND FULL FLUT CLS

## (undated) DEVICE — BLADE ELECTRODE: Brand: EDGE

## (undated) DEVICE — CURVED, LARGE JAW, OPEN SEALER/DIVIDER NANO-COATED: Brand: LIGASURE IMPACT

## (undated) DEVICE — SPONGE LAP 18X18IN STRL -- 5/PK

## (undated) DEVICE — SUTURE STRATAFIX SYMMETRIC SZ 1 L18IN ABSRB VLT CT1 L36CM SXPP1A404

## (undated) DEVICE — DRAPE FLD WRM W44XL66IN C6L FOR INTRATEMP SYS THERMABASIN

## (undated) DEVICE — 450 ML BOTTLE OF 0.05% CHLORHEXIDINE GLUCONATE IN 99.95% STERILE WATER FOR IRRIGATION, USP AND APPLICATOR.: Brand: IRRISEPT ANTIMICROBIAL WOUND LAVAGE

## (undated) DEVICE — MAJOR LAPAROTOMY-MRMC: Brand: MEDLINE INDUSTRIES, INC.

## (undated) DEVICE — Device

## (undated) DEVICE — RELOAD STPL L60MM H1.5-3.6MM REG TISS BLU GRIPPING SURF B

## (undated) DEVICE — 3M™ MEDIPORE™ H SOFT CLOTH TAPE SHORT ROLL TAPE 6INCHES X 2 YARDS 16 ROLLS/CASE 2866S: Brand: 3M™ MEDIPORE™

## (undated) DEVICE — SPONGE GZ W4XL4IN COT 12 PLY TYP VII WVN C FLD DSGN

## (undated) DEVICE — TOTAL TRAY, 16FR 10ML SIL FOLEY, URN: Brand: MEDLINE

## (undated) DEVICE — STAPLER INT L34CM 60MM LNG ENDOSCP ARTC PWR + ECHELON FLX